# Patient Record
Sex: FEMALE | Race: WHITE | NOT HISPANIC OR LATINO | Employment: FULL TIME | URBAN - NONMETROPOLITAN AREA
[De-identification: names, ages, dates, MRNs, and addresses within clinical notes are randomized per-mention and may not be internally consistent; named-entity substitution may affect disease eponyms.]

---

## 2017-11-21 ENCOUNTER — TRANSCRIBE ORDERS (OUTPATIENT)
Dept: RADIOLOGY | Facility: MEDICAL CENTER | Age: 32
End: 2017-11-21

## 2017-11-21 ENCOUNTER — APPOINTMENT (OUTPATIENT)
Dept: LAB | Facility: MEDICAL CENTER | Age: 32
End: 2017-11-21
Payer: COMMERCIAL

## 2017-11-21 ENCOUNTER — APPOINTMENT (OUTPATIENT)
Dept: FAMILY MEDICINE CLINIC | Facility: CLINIC | Age: 32
End: 2017-11-21
Payer: COMMERCIAL

## 2017-11-21 ENCOUNTER — GENERIC CONVERSION - ENCOUNTER (OUTPATIENT)
Dept: OTHER | Facility: OTHER | Age: 32
End: 2017-11-21

## 2017-11-21 DIAGNOSIS — Z36.89 ENCOUNTER FOR OTHER SPECIFIED ANTENATAL SCREENING (CODE): ICD-10-CM

## 2017-11-21 DIAGNOSIS — Z34.90 ENCOUNTER FOR SUPERVISION OF NORMAL PREGNANCY: ICD-10-CM

## 2017-11-21 LAB — B-HCG SERPL-ACNC: ABNORMAL MIU/ML

## 2017-11-21 PROCEDURE — 36415 COLL VENOUS BLD VENIPUNCTURE: CPT

## 2017-11-21 PROCEDURE — T1015 CLINIC SERVICE: HCPCS | Performed by: FAMILY MEDICINE

## 2017-11-21 PROCEDURE — 81025 URINE PREGNANCY TEST: CPT | Performed by: FAMILY MEDICINE

## 2017-11-21 PROCEDURE — 84702 CHORIONIC GONADOTROPIN TEST: CPT

## 2017-11-22 ENCOUNTER — GENERIC CONVERSION - ENCOUNTER (OUTPATIENT)
Dept: OTHER | Facility: OTHER | Age: 32
End: 2017-11-22

## 2017-12-06 ENCOUNTER — TRANSCRIBE ORDERS (OUTPATIENT)
Dept: ADMINISTRATIVE | Facility: HOSPITAL | Age: 32
End: 2017-12-06

## 2017-12-06 DIAGNOSIS — Z34.90 PREGNANCY, UNSPECIFIED GESTATIONAL AGE: Primary | ICD-10-CM

## 2017-12-12 ENCOUNTER — HOSPITAL ENCOUNTER (OUTPATIENT)
Dept: ULTRASOUND IMAGING | Facility: HOSPITAL | Age: 32
Discharge: HOME | End: 2017-12-12
Attending: OBSTETRICS & GYNECOLOGY
Payer: COMMERCIAL

## 2017-12-12 ENCOUNTER — GENERIC CONVERSION - ENCOUNTER (OUTPATIENT)
Dept: OTHER | Facility: OTHER | Age: 32
End: 2017-12-12

## 2017-12-12 DIAGNOSIS — Z36.89 ENCOUNTER FOR OTHER SPECIFIED ANTENATAL SCREENING (CODE): ICD-10-CM

## 2017-12-12 PROCEDURE — 76801 OB US < 14 WKS SINGLE FETUS: CPT

## 2018-01-22 VITALS
SYSTOLIC BLOOD PRESSURE: 120 MMHG | RESPIRATION RATE: 16 BRPM | OXYGEN SATURATION: 97 % | HEART RATE: 91 BPM | TEMPERATURE: 97.7 F | WEIGHT: 167 LBS | HEIGHT: 61 IN | BODY MASS INDEX: 31.53 KG/M2 | DIASTOLIC BLOOD PRESSURE: 80 MMHG

## 2018-01-23 NOTE — RESULT NOTES
Verified Results  US OB LESS THAN 14 WKS WITH TRANSVAGINAL [id number] 12:52PM Maria Piper Order Number: [id number]    - Patient Instructions: To schedule this appointment, please contact Central Scheduling at [phone]  Test Name Result Flag Reference   US OB LESS THAN 14 WEEKS WITH TRANSVAGINAL (Report)     FIRST TRIMESTER OBSTETRIC ULTRASOUND, COMPLETE     INDICATION: Pregnant, evaluate dating  COMPARISON: None  TECHNIQUE:  Transabdominal ultrasound of the pelvis was performed  Additional transvaginal imaging was then performed to better assess the gestation, myometrial/endometrial architecture and ovarian parenchymal detail  The study includes volumetric    sweeps and traditional still imaging technique  FINDINGS:     A single live intrauterine gestation is identified  Cardiac activity is present  Heart rate of 151 bpm      YOLK SAC: Present and normal in size and appearance  MEAN GESTATIONAL SAC SIZE: 60 mm = 12 weeks 1 days    MEAN CROWN RUMP LENGTH: 6 mm = 12 weeks 5 days    FETAL ANATOMY: Appropriate for gestational age  AMNIOTIC FLUID/SAC SHAPE: Within expected normal range  PLACENTA: The placenta is appropriate for gestational age  There is no significant subchorionic fluid collection  UTERUS/ADNEXA:    A 6 5 x 6 9 x 6 5 cm cyst is identified within the left adnexa with a prominent mural nodule identified  Right ovary is unremarkable  The cervix remains closed  No free fluid present  IMPRESSION:     Single live intrauterine gestation at 12 weeks 3 days (range +/- 6 days)  FREDERICK of 6/23/2018  6 5 x 6 9 x 6 5 cm left adnexal cyst with prominent mural nodule  Close interval follow-up of this potentially neoplastic lesion is recommended  Workstation performed: [id number]     Signed by:    Claudio De Leon MD   12/12/17

## 2018-01-26 ENCOUNTER — LAB REQUISITION (OUTPATIENT)
Dept: LAB | Facility: HOSPITAL | Age: 33
End: 2018-01-26
Payer: COMMERCIAL

## 2018-01-26 ENCOUNTER — INITIAL PRENATAL (OUTPATIENT)
Dept: OBGYN CLINIC | Facility: MEDICAL CENTER | Age: 33
End: 2018-01-26
Payer: COMMERCIAL

## 2018-01-26 DIAGNOSIS — Z34.92 ENCOUNTER FOR PREGNANCY RELATED EXAMINATION, SECOND TRIMESTER: Primary | ICD-10-CM

## 2018-01-26 DIAGNOSIS — Z34.92 ENCOUNTER FOR SUPERVISION OF NORMAL PREGNANCY IN SECOND TRIMESTER: ICD-10-CM

## 2018-01-26 LAB
ABO GROUP BLD: NORMAL
BASOPHILS # BLD AUTO: 0.02 THOUSANDS/ΜL (ref 0–0.1)
BASOPHILS NFR BLD AUTO: 0 % (ref 0–1)
BLD GP AB SCN SERPL QL: NEGATIVE
EOSINOPHIL # BLD AUTO: 0.06 THOUSAND/ΜL (ref 0–0.61)
EOSINOPHIL NFR BLD AUTO: 1 % (ref 0–6)
ERYTHROCYTE [DISTWIDTH] IN BLOOD BY AUTOMATED COUNT: 14 % (ref 11.6–15.1)
HCT VFR BLD AUTO: 36 % (ref 34.8–46.1)
HGB BLD-MCNC: 12.3 G/DL (ref 11.5–15.4)
LYMPHOCYTES # BLD AUTO: 2.06 THOUSANDS/ΜL (ref 0.6–4.47)
LYMPHOCYTES NFR BLD AUTO: 19 % (ref 14–44)
MCH RBC QN AUTO: 29.6 PG (ref 26.8–34.3)
MCHC RBC AUTO-ENTMCNC: 34.2 G/DL (ref 31.4–37.4)
MCV RBC AUTO: 87 FL (ref 82–98)
MONOCYTES # BLD AUTO: 0.43 THOUSAND/ΜL (ref 0.17–1.22)
MONOCYTES NFR BLD AUTO: 4 % (ref 4–12)
NEUTROPHILS # BLD AUTO: 8.49 THOUSANDS/ΜL (ref 1.85–7.62)
NEUTS SEG NFR BLD AUTO: 76 % (ref 43–75)
NRBC BLD AUTO-RTO: 0 /100 WBCS
PLATELET # BLD AUTO: 278 THOUSANDS/UL (ref 149–390)
PMV BLD AUTO: 12.1 FL (ref 8.9–12.7)
RBC # BLD AUTO: 4.16 MILLION/UL (ref 3.81–5.12)
RH BLD: POSITIVE
SPECIMEN EXPIRATION DATE: NORMAL
WBC # BLD AUTO: 11.13 THOUSAND/UL (ref 4.31–10.16)

## 2018-01-26 PROCEDURE — 36415 COLL VENOUS BLD VENIPUNCTURE: CPT | Performed by: OBSTETRICS & GYNECOLOGY

## 2018-01-26 PROCEDURE — 82677 ASSAY OF ESTRIOL: CPT | Performed by: OBSTETRICS & GYNECOLOGY

## 2018-01-26 PROCEDURE — 82105 ALPHA-FETOPROTEIN SERUM: CPT | Performed by: OBSTETRICS & GYNECOLOGY

## 2018-01-26 PROCEDURE — 86336 INHIBIN A: CPT | Performed by: OBSTETRICS & GYNECOLOGY

## 2018-01-26 PROCEDURE — 80081 OBSTETRIC PANEL INC HIV TSTG: CPT | Performed by: OBSTETRICS & GYNECOLOGY

## 2018-01-26 PROCEDURE — 84702 CHORIONIC GONADOTROPIN TEST: CPT | Performed by: OBSTETRICS & GYNECOLOGY

## 2018-01-26 NOTE — PROGRESS NOTES
OB ED visit today   Consents signed  PN panel, Quad drawn today  1st ob visit set up for Monday with dr Shawna Ordoñez  Early GTT tbd at the visit

## 2018-01-27 LAB
HBV SURFACE AG SER QL: NORMAL
RUBV IGG SERPL IA-ACNC: 6.3 IU/ML

## 2018-01-29 ENCOUNTER — ROUTINE PRENATAL (OUTPATIENT)
Dept: OBGYN CLINIC | Facility: MEDICAL CENTER | Age: 33
End: 2018-01-29
Payer: COMMERCIAL

## 2018-01-29 VITALS — BODY MASS INDEX: 32.31 KG/M2 | WEIGHT: 171 LBS | SYSTOLIC BLOOD PRESSURE: 122 MMHG | DIASTOLIC BLOOD PRESSURE: 70 MMHG

## 2018-01-29 DIAGNOSIS — Z34.92 ENCOUNTER FOR PREGNANCY RELATED EXAMINATION IN SECOND TRIMESTER: Primary | ICD-10-CM

## 2018-01-29 DIAGNOSIS — Z11.51 SCREENING FOR HPV (HUMAN PAPILLOMAVIRUS): ICD-10-CM

## 2018-01-29 PROBLEM — O09.299 HISTORY OF GESTATIONAL DIABETES IN PRIOR PREGNANCY, CURRENTLY PREGNANT: Status: ACTIVE | Noted: 2018-01-29

## 2018-01-29 PROBLEM — Z86.32 HISTORY OF GESTATIONAL DIABETES IN PRIOR PREGNANCY, CURRENTLY PREGNANT: Status: ACTIVE | Noted: 2018-01-29

## 2018-01-29 PROBLEM — Q51.4 UNICORNUATE UTERUS: Status: ACTIVE | Noted: 2018-01-29

## 2018-01-29 PROBLEM — Z98.891 HISTORY OF CESAREAN SECTION, LOW TRANSVERSE: Status: ACTIVE | Noted: 2018-01-29

## 2018-01-29 PROBLEM — E66.9 OBESITY (BMI 30-39.9): Status: ACTIVE | Noted: 2018-01-29

## 2018-01-29 PROBLEM — N83.209 OVARIAN CYST: Status: ACTIVE | Noted: 2018-01-29

## 2018-01-29 PROBLEM — Z28.39 RUBELLA NON-IMMUNE STATUS, ANTEPARTUM: Status: ACTIVE | Noted: 2018-01-29

## 2018-01-29 PROBLEM — O09.899 RUBELLA NON-IMMUNE STATUS, ANTEPARTUM: Status: ACTIVE | Noted: 2018-01-29

## 2018-01-29 LAB
HIV 1+2 AB+HIV1 P24 AG SERPL QL IA: NORMAL
RPR SER QL: NORMAL

## 2018-01-29 PROCEDURE — 99213 OFFICE O/P EST LOW 20 MIN: CPT | Performed by: OBSTETRICS & GYNECOLOGY

## 2018-01-29 PROCEDURE — 87624 HPV HI-RISK TYP POOLED RSLT: CPT | Performed by: OBSTETRICS & GYNECOLOGY

## 2018-01-29 PROCEDURE — 87591 N.GONORRHOEAE DNA AMP PROB: CPT | Performed by: OBSTETRICS & GYNECOLOGY

## 2018-01-29 PROCEDURE — G0145 SCR C/V CYTO,THINLAYER,RESCR: HCPCS | Performed by: OBSTETRICS & GYNECOLOGY

## 2018-01-29 PROCEDURE — 87491 CHLMYD TRACH DNA AMP PROBE: CPT | Performed by: OBSTETRICS & GYNECOLOGY

## 2018-01-29 PROCEDURE — 87086 URINE CULTURE/COLONY COUNT: CPT | Performed by: OBSTETRICS & GYNECOLOGY

## 2018-01-29 PROCEDURE — 82950 GLUCOSE TEST: CPT | Performed by: OBSTETRICS & GYNECOLOGY

## 2018-01-29 RX ORDER — PNV NO.121/IRON/FOLIC ACID 28MG-0.8MG
1 TABLET ORAL DAILY
COMMUNITY
End: 2018-01-29 | Stop reason: SDUPTHER

## 2018-01-29 NOTE — PROGRESS NOTES
Karen Torre is a 29y o  year old  at 19w4d for first prenatal visit  Nausea No Vomiting No   Exam done today - see OB flowsheet  Pap done Yes   Gonorrhea and Chlamydia sent  Labs reviewed  Rubella equivocal    Genetic testing had Quad screen   Level 2 scheduled 18  Smoking cessation  Prior C/S - requesting repeat  History of A1GDM, early glucola done today    Pt has been counseled re diet, exercise, weight gain, foods to avoid, vaccines in pregnancy, trisomy screening, travel precautions to include seat belt use and VTE risk reduction  She has been provided our pregnancy packet which includes how and when to contact providers, medication recommendations, dietary suggestions, breastfeeding information as well as websites for additional information, hospital and delivery concerns

## 2018-01-30 LAB — GLUCOSE 1H P 50 G GLC PO SERPL-MCNC: 112 MG/DL

## 2018-01-31 LAB
BACTERIA UR CULT: NORMAL
CHLAMYDIA DNA CVX QL NAA+PROBE: NORMAL
N GONORRHOEA DNA GENITAL QL NAA+PROBE: NORMAL

## 2018-02-02 LAB
2ND TRIMESTER 4 SCREEN SERPL-IMP: NORMAL
2ND TRIMESTER 4 SCREEN SERPL-IMP: NORMAL
AFP ADJ MOM SERPL: 0.79
AFP SERPL-MCNC: 36.5 NG/ML
AGE AT DELIVERY: 30.9 YEARS
FET TS 18 RISK FROM MAT AGE: NORMAL
FET TS 21 RISK FROM MAT AGE: 622
GA METHOD: NORMAL
GA: 18.9 WEEKS
HCG ADJ MOM SERPL: 0.16
HCG SERPL-ACNC: 4001 MIU/ML
HPV RRNA GENITAL QL NAA+PROBE: NORMAL
IDDM PATIENT QL: NO
INHIBIN A ADJ MOM SERPL: 0.68
INHIBIN A SERPL-MCNC: 119.78 PG/ML
KARYOTYP BLD/T: NORMAL
LAB AP GYN PRIMARY INTERPRETATION: NORMAL
Lab: NORMAL
MULTIPLE PREGNANCY: NO
NEURAL TUBE DEFECT RISK FETUS: NORMAL %
SERVICE CMNT-IMP: NORMAL
TS 18 RISK FETUS: NORMAL
TS 21 RISK FETUS: NORMAL
U ESTRIOL ADJ MOM SERPL: 0.82
U ESTRIOL SERPL-MCNC: 1.27 NG/ML

## 2018-02-15 ENCOUNTER — ROUTINE PRENATAL (OUTPATIENT)
Dept: PERINATAL CARE | Facility: CLINIC | Age: 33
End: 2018-02-15
Payer: COMMERCIAL

## 2018-02-15 VITALS
DIASTOLIC BLOOD PRESSURE: 80 MMHG | HEIGHT: 61 IN | SYSTOLIC BLOOD PRESSURE: 114 MMHG | WEIGHT: 173 LBS | BODY MASS INDEX: 32.66 KG/M2 | HEART RATE: 92 BPM

## 2018-02-15 DIAGNOSIS — Z98.891 HISTORY OF CESAREAN SECTION, LOW TRANSVERSE: ICD-10-CM

## 2018-02-15 DIAGNOSIS — Z3A.22 22 WEEKS GESTATION OF PREGNANCY: ICD-10-CM

## 2018-02-15 DIAGNOSIS — IMO0001 ANOMALY OF KIDNEY OF FETUS, SINGLE OR UNSPECIFIED FETUS: ICD-10-CM

## 2018-02-15 DIAGNOSIS — Q51.4 UNICORNUATE UTERUS: Primary | ICD-10-CM

## 2018-02-15 PROBLEM — IMO0002 FETAL RENAL ANOMALY: Status: ACTIVE | Noted: 2018-02-15

## 2018-02-15 PROCEDURE — 99242 OFF/OP CONSLTJ NEW/EST SF 20: CPT | Performed by: OBSTETRICS & GYNECOLOGY

## 2018-02-15 NOTE — PROGRESS NOTES
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

## 2018-02-24 ENCOUNTER — HOSPITAL ENCOUNTER (EMERGENCY)
Facility: HOSPITAL | Age: 33
Discharge: HOME | End: 2018-02-24
Attending: EMERGENCY MEDICINE | Admitting: EMERGENCY MEDICINE
Payer: COMMERCIAL

## 2018-02-24 VITALS
BODY MASS INDEX: 33.5 KG/M2 | DIASTOLIC BLOOD PRESSURE: 66 MMHG | HEART RATE: 99 BPM | SYSTOLIC BLOOD PRESSURE: 116 MMHG | WEIGHT: 177.3 LBS | OXYGEN SATURATION: 100 % | TEMPERATURE: 98.4 F | RESPIRATION RATE: 22 BRPM

## 2018-02-24 DIAGNOSIS — O26.859 SPOTTING AND CRAMPING AFFECTING PREGNANCY, ANTEPARTUM: Primary | ICD-10-CM

## 2018-02-24 DIAGNOSIS — O26.899 SPOTTING AND CRAMPING AFFECTING PREGNANCY, ANTEPARTUM: Primary | ICD-10-CM

## 2018-02-24 DIAGNOSIS — R10.9 SPOTTING AND CRAMPING AFFECTING PREGNANCY, ANTEPARTUM: Primary | ICD-10-CM

## 2018-02-24 LAB
BILIRUB UR QL STRIP: NEGATIVE
CLARITY UR: CLEAR
COLOR UR: YELLOW
GLUCOSE UR STRIP-MCNC: NEGATIVE MG/DL
HGB UR QL STRIP.AUTO: NEGATIVE
KETONES UR STRIP-MCNC: NEGATIVE MG/DL
LEUKOCYTE ESTERASE UR QL STRIP: NEGATIVE
NITRITE UR QL STRIP: NEGATIVE
PH UR STRIP.AUTO: 8 [PH] (ref 4.5–8)
PROT UR STRIP-MCNC: NEGATIVE MG/DL
SP GR UR STRIP.AUTO: 1.01 (ref 1–1.03)
UROBILINOGEN UR QL STRIP.AUTO: 0.2 E.U./DL

## 2018-02-24 PROCEDURE — 99284 EMERGENCY DEPT VISIT MOD MDM: CPT

## 2018-02-24 PROCEDURE — 81003 URINALYSIS AUTO W/O SCOPE: CPT | Performed by: EMERGENCY MEDICINE

## 2018-02-24 NOTE — DISCHARGE INSTRUCTIONS
Recommendations from Dr Elliott Loera, on for Dr Sarai Jose:  1) count cramps - if more than 4 per hour, call them right away - they may ask you to be checked at their facility  2) drink enough water that your urine is almost colorless - if it's yellow, drink more water  Abdominal Pain in Pregnancy   WHAT YOU NEED TO KNOW:   Abdominal pain during pregnancy is common  Some of the causes include heartburn, constipation, gas, false labor, and round ligament pain  Round ligament pain is caused by stretching of the ligaments that support your uterus  Abdominal pain may be caused by a health problem, such as a stomach virus or appendicitis (inflammation of the appendix)  The pain may also be caused by a problem with your pregnancy, such as a threatened miscarriage or  labor  DISCHARGE INSTRUCTIONS:   Follow up with your obstetrician within 3 days:  Write down your questions so you remember to ask them during your visits  Self-care:   · Rest may help to relieve round ligament pain  Ask your healthcare provider about other ways to relieve this pain, such as a supportive belt or pregnancy exercises  · Use a heating pad set to the lowest setting or a hot compress to apply heat to your abdomen  Do this for 20 to 30 minutes every 2 hours for as many days as directed  · Avoid quick changes in position or movements that cause pain  · Do not lie flat in bed or bend over if you have heartburn  Ask your obstetrician if you should make any changes to the foods you eat  Ask if you can take any medicines for heartburn  · Eat more fiber and drink more liquids to relieve constipation  Fiber is found in fruits, vegetables, and whole-grain foods, such as whole-wheat bread and cereals  Ask how much liquid to drink each day and which liquids are best for you  Contact your obstetrician if:  · You continue to have abdominal pain that cannot be relieved  · You have a fever       · You have questions or concerns about your condition or care  Return to the emergency department if:   · You have sudden, severe pain or cramps that are so bad that you cannot walk or talk  · You have a fast heartbeat, shortness of breath, and you feel lightheaded or faint  · You have vaginal bleeding or discharge  · You have nausea, vomiting, fever, and severe pain on your right side  © 2017 [address] [employer] Information is for End User's use only and may not be sold, redistributed or otherwise used for commercial purposes  All illustrations and images included in CareNotes® are the copyrighted property of A D A M , Inc  or Prince Val[employer]  The above information is an  only  It is not intended as medical advice for individual conditions or treatments  Talk to your doctor, nurse or pharmacist before following any medical regimen to see if it is safe and effective for you

## 2018-02-24 NOTE — ED PROVIDER NOTES
History  Chief Complaint   Patient presents with    Abdominal Pain Pregnant     pt states that she woke up with spotting and cramping in stomach  is about 24 weeks pregnant     Patient: Lawrence Calvo  32 y o /female  YOB: 1985  MRN: [id number]  PCP: Alex Harmon PA-C  Date of evaluation: 2018    (RICHARDSON Corbin may have been used in the preparation of this document )    This patient presents with her significant other with a chief complaint of spotting and cramping in her low abdomen since awakening this morning  She is about 23 weeks pregnant  Her due date is    She is a   She is being followed as a high risk patient due to a structural problem with 1 of the kidneys in her fetus  Cramping is similar to menstrual cramps  She states that the timing of the cramps is "random"  The cramps last for about 1 minutes when they com  The time between them will be between 5 and 30 minutes  She has had no nausea, vomiting, diarrhea, dysuria, frequency, urgency, sensation of incomplete voiding  History provided by:  Patient, significant other and medical records      Prior to Admission Medications   Prescriptions Last Dose Informant Patient Reported? Taking?    Prenatal Vit-Fe Fumarate-FA (PRENATAL VITAMIN PO)   Yes Yes   Sig: Take 1 tablet by mouth daily      Facility-Administered Medications: None       Past Medical History:   Diagnosis Date    Cancer [facility])     sister with lukemia maternal grandmother passed with skin cancer     Depression 2017    after miscarriage    Gestational diabetes 2014    pregnancy with daughter    Migraine     Suspected problem with fetal growth not found     last assessed 14       Past Surgical History:   Procedure Laterality Date     SECTION  10/10/2014    due to daughter being breech    FOOT SURGERY Left        Family History   Problem Relation Age of Onset    Heart defect Mother      cardiac disorder    Leukemia Sister     Cancer Family     Diabetes Family     Leukemia Family     Parkinsonism Family     No Known Problems Father     No Known Problems Brother     No Known Problems Daughter     No Known Problems Sister     No Known Problems Sister     No Known Problems Sister     No Known Problems Sister     No Known Problems Sister     No Known Problems Sister     No Known Problems Sister     No Known Problems Sister     Cleft palate Brother     No Known Problems Brother     No Known Problems Brother     No Known Problems Brother      I have reviewed and agree with the history as documented  Social History   Substance Use Topics    Smoking status: Current Every Day Smoker     Packs/day: 0 25     Types: Cigarettes    Smokeless tobacco: Never Used    Alcohol use No        Review of Systems   Constitutional: Negative for chills and fever  HENT: Negative for hearing loss, trouble swallowing and voice change  Eyes: Negative for pain, redness and visual disturbance  Respiratory: Negative for cough and shortness of breath  Cardiovascular: Negative for chest pain and palpitations  Gastrointestinal: Negative for abdominal pain, constipation, diarrhea, nausea and vomiting  Genitourinary: Negative for decreased urine volume, difficulty urinating, dysuria, genital sores, hematuria, pelvic pain, urgency and vaginal discharge  Vaginal bleeding: spotting  Musculoskeletal: Negative for back pain, gait problem and neck pain  Skin: Negative for color change and rash  Neurological: Negative for weakness and light-headedness  Psychiatric/Behavioral: Negative for confusion and decreased concentration  The patient is not nervous/anxious  All other systems reviewed and are negative        Physical Exam  ED Triage Vitals [02/24/18 1524]   Temperature Pulse Respirations Blood Pressure SpO2   98 4 °F (36 9 °C) 99 22 116/66 100 %      Temp Source Heart Rate Source Patient Position - Orthostatic VS BP Location FiO2 (%)   Temporal Monitor Sitting Left arm --      Pain Score       4           Orthostatic Vital Signs  Vitals:    02/24/18 1524   BP: 116/66   Pulse: 99   Patient Position - Orthostatic VS: Sitting       Physical Exam   Constitutional: She is oriented to person, place, and time  She appears well-developed and well-nourished  HENT:   Mouth/Throat: Oropharynx is clear and moist and mucous membranes are normal    Voice normal   Eyes: EOM are normal  Pupils are equal, round, and reactive to light  Cardiovascular: Normal rate and regular rhythm  Pulmonary/Chest: Effort normal    Abdominal: Soft  Bowel sounds are normal  There is no tenderness  Gravid   Neurological: She is alert and oriented to person, place, and time  GCS eye subscore is 4  GCS verbal subscore is 5  GCS motor subscore is 6  Skin: Skin is warm and dry  Psychiatric: She has a normal mood and affect  Her speech is normal and behavior is normal    Nursing note and vitals reviewed  ED Medications  Medications - No data to display    Diagnostic Studies  Results Reviewed     Procedure Component Value Units Date/Time    UA w Reflex to Microscopic [[id number]]  (Normal) Collected:  02/24/18 1537    Lab Status:  Final result Specimen:  Urine from Urine, Clean Catch Updated:  02/24/18 1542     Color, UA Yellow     Clarity, UA Clear     Specific Gravity, UA 1 015     pH, UA 8 0     Leukocytes, UA Negative     Nitrite, UA Negative     Protein, UA Negative mg/dl      Glucose, UA Negative mg/dl      Ketones, UA Negative mg/dl      Urobilinogen, UA 0 2 E U /dl      Bilirubin, UA Negative     Blood, UA Negative                 No orders to display              Procedures  Procedures       Phone Contacts  ED Phone Contact    ED Course  ED Course as of Feb 24 2339   Sat Feb 24, 2018   1545 T reassuring  [address] pt's OB office - she sees Dr Taveras Masters           Recommendations from Dr Madan Catalan, on for   Amanda:  1) count cramps - if more than 4 per hour, call them right away - they may ask you to be checked at their facility  2) drink enough water that your urine is almost colorless - if it's yellow, drink more water  [facility] [place]  CritCare Time    Disposition  Final diagnoses:   Spotting and cramping affecting pregnancy, antepartum     Time reflects when diagnosis was documented in both MDM as applicable and the Disposition within this note     Time User Action Codes Description Comment    2/24/2018  4:04 PM Elsa Roberts Add [O26 859,  O26 899] Spotting and cramping affecting pregnancy, antepartum       ED Disposition     ED Disposition Condition Comment    Discharge  Ana Luisa Bowman discharge to home/self care  Condition at discharge: Good        Follow-up Information     Follow up With Specialties Details Why [address] [facility], Gynecology Call in 2 days For followup, Tell about this ER visit  [facility] [address]          Discharge Medication List as of 2/24/2018  4:14 PM      CONTINUE these medications which have NOT CHANGED    Details   Prenatal Vit-Fe Fumarate-FA (PRENATAL VITAMIN PO) Take 1 tablet by mouth daily, Starting Tue 11/21/2017, Historical Med           No discharge procedures on file      ED Provider  Electronically Signed by           Favian Bach MD  02/24/18 9847

## 2018-02-27 ENCOUNTER — HOSPITAL ENCOUNTER (EMERGENCY)
Facility: HOSPITAL | Age: 33
Discharge: HOME | End: 2018-02-27
Attending: EMERGENCY MEDICINE | Admitting: EMERGENCY MEDICINE
Payer: OTHER MISCELLANEOUS

## 2018-02-27 VITALS
TEMPERATURE: 98.4 F | SYSTOLIC BLOOD PRESSURE: 122 MMHG | DIASTOLIC BLOOD PRESSURE: 87 MMHG | BODY MASS INDEX: 34.39 KG/M2 | WEIGHT: 182 LBS | OXYGEN SATURATION: 100 % | RESPIRATION RATE: 16 BRPM | HEART RATE: 88 BPM

## 2018-02-27 DIAGNOSIS — S01.01XA SCALP LACERATION, INITIAL ENCOUNTER: Primary | ICD-10-CM

## 2018-02-27 DIAGNOSIS — S00.83XA FOREHEAD CONTUSION, INITIAL ENCOUNTER: ICD-10-CM

## 2018-02-27 PROCEDURE — 90471 IMMUNIZATION ADMIN: CPT

## 2018-02-27 PROCEDURE — 99283 EMERGENCY DEPT VISIT LOW MDM: CPT

## 2018-02-27 PROCEDURE — 90715 TDAP VACCINE 7 YRS/> IM: CPT | Performed by: EMERGENCY MEDICINE

## 2018-02-27 RX ORDER — ACETAMINOPHEN 325 MG/1
650 TABLET ORAL ONCE
Status: COMPLETED | OUTPATIENT
Start: 2018-02-27 | End: 2018-02-27

## 2018-02-27 RX ADMIN — TETANUS TOXOID, REDUCED DIPHTHERIA TOXOID AND ACELLULAR PERTUSSIS VACCINE, ADSORBED 0.5 ML: 5; 2.5; 8; 8; 2.5 SUSPENSION INTRAMUSCULAR at 16:39

## 2018-02-27 RX ADMIN — ACETAMINOPHEN 650 MG: 325 TABLET, FILM COATED ORAL at 16:32

## 2018-02-27 NOTE — ED PROVIDER NOTES
History  Chief Complaint   Patient presents with    Head Injury     Patient was opening a metal cabinet when the door hit her on the top of the head  Patient denies any LOC and is not on any blood thinners  Patient is 23 weeks pregnant  82-year-old female on duty at [employer] [facility] [employer] [facility] she was adjusting a shelf when a 2nd shelf came down and hit her in the forehead since she sustained a scalp laceration of the right frontal region  There is no loss of conscious she did not sustain a fall she has a mild headache no visual disturbance she was she had some brief fleeting nausea at the scene which resolved the bleeding has been controlled with direct pressure  She denies any neck pain she has had no numbness tingling no difficulties with speech no malocclusion no nosebleed she is unsure of the last tetanus she has no weakness of baby continues to be active she is 23 weeks pregnant she follows with Dr Jose Chavez  out of [facility]  She denies any direct trauma to the abdomen she has no chest pain no shortness of breath no lightheadedness no weakness  Prior to Admission Medications   Prescriptions Last Dose Informant Patient Reported? Taking?    Prenatal Vit-Fe Fumarate-FA (PRENATAL VITAMIN PO)   Yes Yes   Sig: Take 1 tablet by mouth daily      Facility-Administered Medications: None       Past Medical History:   Diagnosis Date    Cancer [facility])     sister with lukemia maternal grandmother passed with skin cancer     Depression 2017    after miscarriage    Gestational diabetes     pregnancy with daughter    Migraine     Suspected problem with fetal growth not found     last assessed 14       Past Surgical History:   Procedure Laterality Date     SECTION  10/10/2014    due to daughter being breech    FOOT SURGERY Left        Family History   Problem Relation Age of Onset    Heart defect Mother      cardiac disorder    Leukemia Sister     Cancer Family     Diabetes Family     Leukemia Family     Parkinsonism Family     No Known Problems Father     No Known Problems Brother     No Known Problems Daughter     No Known Problems Sister     No Known Problems Sister     No Known Problems Sister     No Known Problems Sister     No Known Problems Sister     No Known Problems Sister     No Known Problems Sister     No Known Problems Sister     Cleft palate Brother     No Known Problems Brother     No Known Problems Brother     No Known Problems Brother      I have reviewed and agree with the history as documented  Social History   Substance Use Topics    Smoking status: Current Every Day Smoker     Packs/day: 0 20     Types: Cigarettes    Smokeless tobacco: Never Used    Alcohol use No        Review of Systems   Constitutional: Negative for activity change, chills and fever  HENT: Negative for congestion, ear pain, nosebleeds and rhinorrhea  Eyes: Negative for discharge and visual disturbance  Respiratory: Negative for cough, choking and shortness of breath  Cardiovascular: Negative for chest pain and leg swelling  Gastrointestinal: Negative for abdominal distention, abdominal pain, nausea and vomiting  Genitourinary: Negative for pelvic pain, vaginal bleeding, vaginal discharge and vaginal pain  Musculoskeletal: Negative for back pain, neck pain and neck stiffness  Skin: Positive for wound (rt frontal scalp)  Neurological: Positive for headaches  Negative for dizziness, syncope, weakness, light-headedness and numbness  Hematological: Does not bruise/bleed easily  Psychiatric/Behavioral: Negative for confusion  All other systems reviewed and are negative        Physical Exam  ED Triage Vitals   Temperature Pulse Respirations Blood Pressure SpO2   02/27/18 1610 02/27/18 1610 02/27/18 1610 02/27/18 1610 02/27/18 1610   98 4 °F (36 9 °C) 90 20 115/77 98 %      Temp Source Heart Rate Source Patient Position - Orthostatic VS BP Location FiO2 (%) 02/27/18 1610 02/27/18 1610 02/27/18 1610 02/27/18 1701 --   Temporal Monitor Sitting Left arm       Pain Score       02/27/18 1632       4           Orthostatic Vital Signs  Vitals:    02/27/18 1610 02/27/18 1615 02/27/18 1630 02/27/18 1701   BP: 115/77 120/80 121/81 122/87   Pulse: 90 82 84 88   Patient Position - Orthostatic VS: Sitting   Sitting       Physical Exam   Constitutional: She is oriented to person, place, and time  She appears well-developed and well-nourished  No distress  HENT:   Head:       Right Ear: External ear normal    Left Ear: External ear normal    Nose: Nose normal    Mouth/Throat: Oropharynx is clear and moist  No oropharyngeal exudate  1cm rt frontal scalp laceration ; sl erythema rt forehead   Eyes: Conjunctivae and EOM are normal  Pupils are equal, round, and reactive to light  Right eye exhibits no discharge  Left eye exhibits no discharge  3mm equal   Neck: Normal range of motion  Neck supple  No midline or paraspinous tenderness   Cardiovascular: Normal rate, regular rhythm, normal heart sounds and intact distal pulses  Pulmonary/Chest: Effort normal and breath sounds normal  No respiratory distress  She has no wheezes  She has no rales  Abdominal: Soft  Bowel sounds are normal  She exhibits no distension and no mass  There is no tenderness  There is no guarding  Back no midline T or L spine tenderness   Musculoskeletal: Normal range of motion  She exhibits no edema, tenderness or deformity  Lymphadenopathy:     She has no cervical adenopathy  Neurological: She is alert and oriented to person, place, and time  She displays normal reflexes  No cranial nerve deficit or sensory deficit  She exhibits normal muscle tone  Coordination normal    GCS 15 steady gait to bathroom   Skin: Skin is warm and dry  Capillary refill takes less than 2 seconds  Psychiatric: She has a normal mood and affect  Vitals reviewed        ED Medications  Medications   acetaminophen (TYLENOL) tablet 650 mg (650 mg Oral Given 2/27/18 1632)   tetanus-diphtheria-acellular pertussis (BOOSTRIX) IM injection 0 5 mL (0 5 mL Intramuscular Given 2/27/18 1639)       Diagnostic Studies  Results Reviewed     None                 No orders to display              Procedures  Lac Repair  Date/Time: 2/27/2018 4:46 PM  Performed by: Niles Lesch  Authorized by: Niles Lesch   Consent: Verbal consent obtained  Risks and benefits: risks, benefits and alternatives were discussed  Consent given by: patient  Patient understanding: patient states understanding of the procedure being performed  Patient identity confirmed: verbally with patient and arm band  Body area: head/neck  Location details: scalp  Laceration length: 1 cm    Wound Dehiscence:  Superficial Wound Dehiscence: simple closure      Procedure Details:  Irrigation solution: saline  Irrigation method: syringe  Amount of cleaning: standard  Skin closure: glue (hair tie)  Number of sutures: 2 (hair ties)  Approximation: close  Approximation difficulty: simple  Dressing: none    Patient tolerance: Patient tolerated the procedure well with no immediate complications             Phone Contacts  ED Phone Contact    ED Course  ED Course                                MDM  Number of Diagnoses or Management Options  Forehead contusion, initial encounter:   Scalp laceration, initial encounter:   Diagnosis management comments: Fetal heart tones 153; workman's comp paperwork filled out    CritCare Time    Disposition  Final diagnoses:   Scalp laceration, initial encounter - 1 cm simple hair tie closure   Forehead contusion, initial encounter     Time reflects when diagnosis was documented in both MDM as applicable and the Disposition within this note     Time User Action Codes Description Comment    2/27/2018  4:47 PM Aria Blood Add [S01 01XA] Scalp laceration, initial encounter     2/27/2018  4:47 PM Aria Blood Modify [S01 01XA] Scalp laceration, initial encounter 1 cm simple hair tie closure    2/27/2018  4:48 PM Juju Espana Forehead contusion, initial encounter       ED Disposition     ED Disposition Condition Comment    Discharge  Ana Luisa Bowman discharge to home/self care  Condition at discharge: Good        Follow-up Information     Follow up With Specialties Details Why Contact Info Additional Information    [address] [facility] [address] [facility] [address] [facility], [address] [facility] [place], [address]        Discharge Medication List as of 2/27/2018  4:59 PM      CONTINUE these medications which have NOT CHANGED    Details   Prenatal Vit-Fe Fumarate-FA (PRENATAL VITAMIN PO) Take 1 tablet by mouth daily, Starting Tue 11/21/2017, Historical Med           No discharge procedures on file      ED Provider  Electronically Signed by           Shameka Palacios MD  02/27/18 9423

## 2018-02-27 NOTE — DISCHARGE INSTRUCTIONS
Skin Adhesive Care   WHAT YOU NEED TO KNOW:   Skin adhesive is medical glue used to close wounds  It is a substitute for staples and stitches  Skin adhesive wound closures take less time and do not require anesthesia  You have less pain and a lower risk of infection than with staples or stitches  Skin adhesive will fall off after the wound is healed  DISCHARGE INSTRUCTIONS:   Self-care:   · Keep your wound clean and dry  for 1 to 5 days  You can shower 24 hours after the skin adhesive is applied  Lightly pat your wound dry after you shower  · Do not soak  your wound in water, such as in a bath or hot tub  · Do not scrub  your wound or pick at the adhesive  This can make your wound reopen  · Do not apply ointments  to your wound  These include antibiotic and other ointments that contain petroleum jelly  These products will remove skin adhesive and reopen your wound  Follow up with your healthcare provider as directed:  Write down your questions so you remember to ask them during your visits  Contact your healthcare provider if:   · You have a fever  · Your wound is red and warm to touch  · You have questions or concerns about your condition or care  Return to the emergency department if:   · Your wound has fluid draining from it  · Your wound opens  © 2017 [address] Information is for End User's use only and may not be sold, redistributed or otherwise used for commercial purposes  All illustrations and images included in CareNotes® are the copyrighted property of A D A M , Inc  or Prince Valdes  The above information is an  only  It is not intended as medical advice for individual conditions or treatments  Talk to your doctor, nurse or pharmacist before following any medical regimen to see if it is safe and effective for you  Head Injury   WHAT YOU NEED TO KNOW:   A head injury is most often caused by a blow to the head   This may occur from a fall, bicycle injury, sports injury, being struck in the head, or a motor vehicle accident  DISCHARGE INSTRUCTIONS:   Call 911 or have someone else call for any of the following:   · You cannot be woken  · You have a seizure  · You stop responding to others or you faint  · You have blurry or double vision  · Your speech becomes slurred or confused  · You have arm or leg weakness, loss of feeling, or new problems with coordination  · Your pupils are larger than usual or one pupil is a different size than the other  · You have blood or clear fluid coming out of your ears or nose  Return to the emergency department if:   · You have repeated or forceful vomiting  · You feel confused  · Your headache gets worse or becomes severe  · You or someone caring for you notices that you are harder to wake than usual   Contact your healthcare provider if:   · Your symptoms last longer than 6 weeks after the injury  · You have questions or concerns about your condition or care  Medicines:   · Acetaminophen  decreases pain  Acetaminophen is available without a doctor's order  Ask how much to take and how often to take it  Follow directions  Acetaminophen can cause liver damage if not taken correctly  · Take your medicine as directed  Contact your healthcare provider if you think your medicine is not helping or if you have side effects  Tell him or her if you are allergic to any medicine  Keep a list of the medicines, vitamins, and herbs you take  Include the amounts, and when and why you take them  Bring the list or the pill bottles to follow-up visits  Carry your medicine list with you in case of an emergency  Self-care:   · Rest  or do quiet activities for 24 to 48 hours  Limit your time watching TV, using the computer, or doing tasks that require a lot of thinking  Slowly return to your normal activities as directed   Do not play sports or do activities that may cause you to get hit in the head  Ask your healthcare provider when you can return to sports  · Apply ice  on your head for 15 to 20 minutes every hour or as directed  Use an ice pack, or put crushed ice in a plastic bag  Cover it with a towel before you apply it to your skin  Ice helps prevent tissue damage and decreases swelling and pain  · Have someone stay with you for 24 hours  or as directed  This person can monitor you for complications and call [id number]  When you are awake the person should ask you a few questions to see if you are thinking clearly  An example would be to ask your name or your address  Prevent another head injury:   · Wear a helmet that fits properly  Do this when you play sports, or ride a bike, scooter, or skateboard  Helmets help decrease your risk of a serious head injury  Talk to your healthcare provider about other ways you can protect yourself if you play sports  · Wear your seat belt every time you are in a car  This helps to decrease your risk for a head injury if you are in a car accident  Follow up with your healthcare provider as directed:  Write down your questions so you remember to ask them during your visits  © 2017 [address]  Information is for End User's use only and may not be sold, redistributed or otherwise used for commercial purposes  All illustrations and images included in CareNotes® are the copyrighted property of [employer]  or Prince Valdes  The above information is an  only  It is not intended as medical advice for individual conditions or treatments  Talk to your doctor, nurse or pharmacist before following any medical regimen to see if it is safe and effective for you    Tylenol 650 mg every 6 hours as needed for pain

## 2018-03-12 ENCOUNTER — ROUTINE PRENATAL (OUTPATIENT)
Dept: OBGYN CLINIC | Facility: MEDICAL CENTER | Age: 33
End: 2018-03-12
Payer: COMMERCIAL

## 2018-03-12 VITALS — BODY MASS INDEX: 35.01 KG/M2 | SYSTOLIC BLOOD PRESSURE: 132 MMHG | WEIGHT: 185.3 LBS | DIASTOLIC BLOOD PRESSURE: 70 MMHG

## 2018-03-12 DIAGNOSIS — O09.899 RUBELLA NON-IMMUNE STATUS, ANTEPARTUM: ICD-10-CM

## 2018-03-12 DIAGNOSIS — Z3A.25 25 WEEKS GESTATION OF PREGNANCY: Primary | ICD-10-CM

## 2018-03-12 DIAGNOSIS — IMO0001 ANOMALY OF KIDNEY OF FETUS, SINGLE OR UNSPECIFIED FETUS: ICD-10-CM

## 2018-03-12 DIAGNOSIS — Z28.39 RUBELLA NON-IMMUNE STATUS, ANTEPARTUM: ICD-10-CM

## 2018-03-12 PROBLEM — N83.209 OVARIAN CYST: Status: RESOLVED | Noted: 2018-01-29 | Resolved: 2018-03-12

## 2018-03-12 PROBLEM — Z3A.22 22 WEEKS GESTATION OF PREGNANCY: Status: RESOLVED | Noted: 2018-02-15 | Resolved: 2018-03-12

## 2018-03-12 PROCEDURE — 99213 OFFICE O/P EST LOW 20 MIN: CPT | Performed by: NURSE PRACTITIONER

## 2018-03-12 NOTE — PROGRESS NOTES
Liyah Snyder is a 29y o  year old  at 21w3d for routine prenatal visit    + FM, no vaginal bleeding, contractions, or LOF  Complaints: No   Most recent ultrasound and labs reviewed  States trying to schedule f/u scan at [facility] due mid march  Will try calling again or have  Spouse call to make appt  Following for multicystic dysplastic kidney, and other kidney w pyeletasis  [facility]kc, ptl precautions given

## 2018-03-26 ENCOUNTER — ULTRASOUND (OUTPATIENT)
Dept: PERINATAL CARE | Facility: CLINIC | Age: 33
End: 2018-03-26
Payer: COMMERCIAL

## 2018-03-26 VITALS
HEART RATE: 99 BPM | HEIGHT: 61 IN | WEIGHT: 183.2 LBS | BODY MASS INDEX: 34.59 KG/M2 | SYSTOLIC BLOOD PRESSURE: 116 MMHG | DIASTOLIC BLOOD PRESSURE: 79 MMHG

## 2018-03-26 DIAGNOSIS — IMO0001 ANOMALY OF KIDNEY OF FETUS, SINGLE OR UNSPECIFIED FETUS: Primary | ICD-10-CM

## 2018-03-26 DIAGNOSIS — Z3A.27 27 WEEKS GESTATION OF PREGNANCY: ICD-10-CM

## 2018-03-26 PROCEDURE — 99212 OFFICE O/P EST SF 10 MIN: CPT | Performed by: OBSTETRICS & GYNECOLOGY

## 2018-03-26 PROCEDURE — 76816 OB US FOLLOW-UP PER FETUS: CPT | Performed by: OBSTETRICS & GYNECOLOGY

## 2018-04-10 ENCOUNTER — ROUTINE PRENATAL (OUTPATIENT)
Dept: OBGYN CLINIC | Facility: MEDICAL CENTER | Age: 33
End: 2018-04-10
Payer: COMMERCIAL

## 2018-04-10 VITALS — DIASTOLIC BLOOD PRESSURE: 66 MMHG | BODY MASS INDEX: 34.96 KG/M2 | WEIGHT: 185 LBS | SYSTOLIC BLOOD PRESSURE: 110 MMHG

## 2018-04-10 DIAGNOSIS — Q61.4 MULTICYSTIC DYSPLASTIC KIDNEY: ICD-10-CM

## 2018-04-10 DIAGNOSIS — Z34.92 ENCOUNTER FOR PREGNANCY RELATED EXAMINATION IN SECOND TRIMESTER: Primary | ICD-10-CM

## 2018-04-10 LAB
BASOPHILS # BLD AUTO: 0.03 THOUSANDS/ΜL (ref 0–0.1)
BASOPHILS NFR BLD AUTO: 0 % (ref 0–1)
EOSINOPHIL # BLD AUTO: 0.11 THOUSAND/ΜL (ref 0–0.61)
EOSINOPHIL NFR BLD AUTO: 1 % (ref 0–6)
ERYTHROCYTE [DISTWIDTH] IN BLOOD BY AUTOMATED COUNT: 13.9 % (ref 11.6–15.1)
GLUCOSE 1H P 50 G GLC PO SERPL-MCNC: 176 MG/DL
HCT VFR BLD AUTO: 34.6 % (ref 34.8–46.1)
HGB BLD-MCNC: 11.4 G/DL (ref 11.5–15.4)
LYMPHOCYTES # BLD AUTO: 2.67 THOUSANDS/ΜL (ref 0.6–4.47)
LYMPHOCYTES NFR BLD AUTO: 18 % (ref 14–44)
MCH RBC QN AUTO: 29.2 PG (ref 26.8–34.3)
MCHC RBC AUTO-ENTMCNC: 32.9 G/DL (ref 31.4–37.4)
MCV RBC AUTO: 89 FL (ref 82–98)
MONOCYTES # BLD AUTO: 0.58 THOUSAND/ΜL (ref 0.17–1.22)
MONOCYTES NFR BLD AUTO: 4 % (ref 4–12)
NEUTROPHILS # BLD AUTO: 11.12 THOUSANDS/ΜL (ref 1.85–7.62)
NEUTS SEG NFR BLD AUTO: 77 % (ref 43–75)
NRBC BLD AUTO-RTO: 0 /100 WBCS
PLATELET # BLD AUTO: 242 THOUSANDS/UL (ref 149–390)
PMV BLD AUTO: 11.5 FL (ref 8.9–12.7)
RBC # BLD AUTO: 3.9 MILLION/UL (ref 3.81–5.12)
WBC # BLD AUTO: 14.66 THOUSAND/UL (ref 4.31–10.16)

## 2018-04-10 PROCEDURE — 99213 OFFICE O/P EST LOW 20 MIN: CPT | Performed by: NURSE PRACTITIONER

## 2018-04-10 PROCEDURE — 36415 COLL VENOUS BLD VENIPUNCTURE: CPT | Performed by: NURSE PRACTITIONER

## 2018-04-10 PROCEDURE — 82950 GLUCOSE TEST: CPT | Performed by: NURSE PRACTITIONER

## 2018-04-10 PROCEDURE — 85025 COMPLETE CBC W/AUTO DIFF WBC: CPT | Performed by: NURSE PRACTITIONER

## 2018-04-10 NOTE — PROGRESS NOTES
Mg Agudelo is a 29y o  year old  at 34w10d for routine prenatal visit    + FM, no vaginal bleeding, contractions, or LOF  Complaints: recently confirmed that baby does have one multicystic dysplastic kidney at [facility]  Would like more info about prognosis, Gave referral to Dr Liz Belcher nephologist    [facility]  Most recent ultrasound and labs reviewed  Glucose and cbc drawn  Rhogam not needed  28 week booklet given  Pt  To keep appt  next week with  , states was r/s from Dr lewis due to snow in the past    States is interested in [facility], PTL reviewed

## 2018-04-12 ENCOUNTER — TELEPHONE (OUTPATIENT)
Dept: OBGYN CLINIC | Facility: MEDICAL CENTER | Age: 33
End: 2018-04-12

## 2018-04-12 DIAGNOSIS — R73.09 ELEVATED GLUCOSE TOLERANCE TEST: Primary | ICD-10-CM

## 2018-04-23 ENCOUNTER — ULTRASOUND (OUTPATIENT)
Dept: PERINATAL CARE | Facility: CLINIC | Age: 33
End: 2018-04-23
Payer: COMMERCIAL

## 2018-04-23 VITALS
DIASTOLIC BLOOD PRESSURE: 76 MMHG | WEIGHT: 182.6 LBS | HEART RATE: 106 BPM | SYSTOLIC BLOOD PRESSURE: 116 MMHG | BODY MASS INDEX: 34.48 KG/M2 | HEIGHT: 61 IN

## 2018-04-23 DIAGNOSIS — IMO0001 ANOMALY OF KIDNEY OF FETUS, SINGLE OR UNSPECIFIED FETUS: Primary | ICD-10-CM

## 2018-04-23 DIAGNOSIS — Z3A.31 31 WEEKS GESTATION OF PREGNANCY: ICD-10-CM

## 2018-04-23 PROCEDURE — 76816 OB US FOLLOW-UP PER FETUS: CPT | Performed by: OBSTETRICS & GYNECOLOGY

## 2018-04-23 PROCEDURE — 99212 OFFICE O/P EST SF 10 MIN: CPT | Performed by: OBSTETRICS & GYNECOLOGY

## 2018-04-23 NOTE — PATIENT INSTRUCTIONS

## 2018-05-04 ENCOUNTER — ROUTINE PRENATAL (OUTPATIENT)
Dept: OBGYN CLINIC | Facility: MEDICAL CENTER | Age: 33
End: 2018-05-04
Payer: COMMERCIAL

## 2018-05-04 VITALS — DIASTOLIC BLOOD PRESSURE: 70 MMHG | BODY MASS INDEX: 34.56 KG/M2 | SYSTOLIC BLOOD PRESSURE: 114 MMHG | WEIGHT: 182.9 LBS

## 2018-05-04 DIAGNOSIS — Z3A.33 33 WEEKS GESTATION OF PREGNANCY: Primary | ICD-10-CM

## 2018-05-04 PROCEDURE — 99213 OFFICE O/P EST LOW 20 MIN: CPT | Performed by: NURSE PRACTITIONER

## 2018-05-04 NOTE — PROGRESS NOTES
Allyn Hicks is a 29y o  year old  at 33w1d for routine prenatal visit    + FM, no vaginal bleeding, contractions, or LOF  Complaints: No   Most recent ultrasound and labs reviewed  Pt has birth plan to complete, and to call for pbv  Pt  Wants btl with c/s  Mirian to schedule c/s with btl for  per Dr Aj Morgan  [facility], ptl precautions reviewed

## 2018-05-21 ENCOUNTER — ROUTINE PRENATAL (OUTPATIENT)
Dept: OBGYN CLINIC | Facility: MEDICAL CENTER | Age: 33
End: 2018-05-21
Payer: COMMERCIAL

## 2018-05-21 VITALS — WEIGHT: 183.9 LBS | DIASTOLIC BLOOD PRESSURE: 80 MMHG | BODY MASS INDEX: 34.75 KG/M2 | SYSTOLIC BLOOD PRESSURE: 112 MMHG

## 2018-05-21 DIAGNOSIS — Z34.93 THIRD TRIMESTER PREGNANCY: Primary | ICD-10-CM

## 2018-05-21 PROCEDURE — 87653 STREP B DNA AMP PROBE: CPT | Performed by: OBSTETRICS & GYNECOLOGY

## 2018-05-21 PROCEDURE — 99213 OFFICE O/P EST LOW 20 MIN: CPT | Performed by: OBSTETRICS & GYNECOLOGY

## 2018-05-21 NOTE — PROGRESS NOTES
Nayana Mcdonnell is a 29y o  year old  at 35w4d for routine prenatal visit  GBS done Yes  PCN allergy Yes  Labor precautions given  [address]     Has follow up apt at [facility] for repeat scan and follow up left kidney  Patient has not been compliant with going for 3 hr gtt - stressed importance of this and risks of untreated GDM if present / note to excuse patient from work given for patient to go tomorrow to get this done  Has repeat C/S scheduled with Dr Saray Mann on 6/15

## 2018-05-22 ENCOUNTER — APPOINTMENT (OUTPATIENT)
Dept: LAB | Facility: HOSPITAL | Age: 33
End: 2018-05-22
Payer: COMMERCIAL

## 2018-05-22 LAB
GLUCOSE 1H P 100 G GLC PO SERPL-MCNC: 173 MG/DL (ref 65–179)
GLUCOSE 2H P 100 G GLC PO SERPL-MCNC: 168 MG/DL (ref 65–154)
GLUCOSE 3H P 100 G GLC PO SERPL-MCNC: 92 MG/DL (ref 40–500)
GLUCOSE P FAST SERPL-MCNC: 76 MG/DL (ref 65–99)

## 2018-05-22 PROCEDURE — 82951 GLUCOSE TOLERANCE TEST (GTT): CPT

## 2018-05-22 PROCEDURE — 82952 GTT-ADDED SAMPLES: CPT

## 2018-05-22 PROCEDURE — 36415 COLL VENOUS BLD VENIPUNCTURE: CPT

## 2018-05-23 LAB — GP B STREP DNA SPEC QL NAA+PROBE: NORMAL

## 2018-05-31 ENCOUNTER — ROUTINE PRENATAL (OUTPATIENT)
Dept: OBGYN CLINIC | Facility: MEDICAL CENTER | Age: 33
End: 2018-05-31
Payer: COMMERCIAL

## 2018-05-31 VITALS — DIASTOLIC BLOOD PRESSURE: 60 MMHG | BODY MASS INDEX: 33.82 KG/M2 | WEIGHT: 179 LBS | SYSTOLIC BLOOD PRESSURE: 112 MMHG

## 2018-05-31 DIAGNOSIS — O99.213 OBESITY AFFECTING PREGNANCY IN THIRD TRIMESTER: ICD-10-CM

## 2018-05-31 DIAGNOSIS — Z3A.37 37 WEEKS GESTATION OF PREGNANCY: ICD-10-CM

## 2018-05-31 DIAGNOSIS — Z98.891 HISTORY OF CESAREAN SECTION, LOW TRANSVERSE: Primary | ICD-10-CM

## 2018-05-31 PROCEDURE — 99213 OFFICE O/P EST LOW 20 MIN: CPT | Performed by: OBSTETRICS & GYNECOLOGY

## 2018-05-31 NOTE — PROGRESS NOTES
Paola Baumann is a 29y o  year old  at 37w0d for routine prenatal visit    + FM, no vaginal bleeding, contractions, or LOF  Complaints: No   Most recent ultrasound and labs reviewed  Scheduled for RLTCS/BTL 18 with Dr Mara Her

## 2018-06-04 ENCOUNTER — TELEPHONE (OUTPATIENT)
Dept: OBGYN CLINIC | Facility: MEDICAL CENTER | Age: 33
End: 2018-06-04

## 2018-06-04 ENCOUNTER — ULTRASOUND (OUTPATIENT)
Dept: PERINATAL CARE | Facility: CLINIC | Age: 33
End: 2018-06-04
Payer: COMMERCIAL

## 2018-06-04 ENCOUNTER — OFFICE VISIT (OUTPATIENT)
Dept: NEPHROLOGY | Facility: CLINIC | Age: 33
End: 2018-06-04
Payer: COMMERCIAL

## 2018-06-04 VITALS
WEIGHT: 179 LBS | DIASTOLIC BLOOD PRESSURE: 73 MMHG | HEIGHT: 61 IN | HEART RATE: 85 BPM | BODY MASS INDEX: 33.79 KG/M2 | SYSTOLIC BLOOD PRESSURE: 111 MMHG

## 2018-06-04 VITALS
HEIGHT: 61 IN | SYSTOLIC BLOOD PRESSURE: 118 MMHG | BODY MASS INDEX: 34.36 KG/M2 | DIASTOLIC BLOOD PRESSURE: 76 MMHG | WEIGHT: 182 LBS

## 2018-06-04 DIAGNOSIS — O35.8XX0 PREGNANCY COMPLICATED BY FETAL MULTICYSTIC DYSPLASTIC KIDNEY, SINGLE OR UNSPECIFIED FETUS: ICD-10-CM

## 2018-06-04 DIAGNOSIS — Z3A.37 37 WEEKS GESTATION OF PREGNANCY: ICD-10-CM

## 2018-06-04 DIAGNOSIS — IMO0001 ANOMALY OF KIDNEY OF FETUS, SINGLE OR UNSPECIFIED FETUS: Primary | ICD-10-CM

## 2018-06-04 DIAGNOSIS — O99.213 OBESITY AFFECTING PREGNANCY IN THIRD TRIMESTER: ICD-10-CM

## 2018-06-04 DIAGNOSIS — O35.8XX3: Primary | ICD-10-CM

## 2018-06-04 LAB
SL AMB  POCT GLUCOSE, UA: NEGATIVE
SL AMB LEUKOCYTE ESTERASE,UA: NEGATIVE
SL AMB POCT BILIRUBIN,UA: NEGATIVE
SL AMB POCT BLOOD,UA: NEGATIVE
SL AMB POCT CLARITY,UA: CLEAR
SL AMB POCT COLOR,UA: YELLOW
SL AMB POCT KETONES,UA: NEGATIVE
SL AMB POCT NITRITE,UA: NEGATIVE
SL AMB POCT PH,UA: 6.5
SL AMB POCT SPECIFIC GRAVITY,UA: 1.01
SL AMB POCT URINE PROTEIN: NEGATIVE
SL AMB POCT UROBILINOGEN: 0.2

## 2018-06-04 PROCEDURE — 76816 OB US FOLLOW-UP PER FETUS: CPT | Performed by: OBSTETRICS & GYNECOLOGY

## 2018-06-04 PROCEDURE — 99212 OFFICE O/P EST SF 10 MIN: CPT | Performed by: OBSTETRICS & GYNECOLOGY

## 2018-06-04 PROCEDURE — 99243 OFF/OP CNSLTJ NEW/EST LOW 30: CPT | Performed by: PEDIATRICS

## 2018-06-04 PROCEDURE — 81002 URINALYSIS NONAUTO W/O SCOPE: CPT | Performed by: PEDIATRICS

## 2018-06-04 NOTE — PATIENT INSTRUCTIONS
1  Fetal anomaly: Discussed potential implications multicystic dysplastic kidney with mom today  Will plan to check an ultrasound after infant is born to determine next steps  Plan for infant to see us in nephrology after birth

## 2018-06-04 NOTE — LETTER
June 4, 2018     Rachel Holley PA-C  [facility]    Patient: Noemí Shi   YOB: 1985   Date of Visit: 6/4/2018       Dear Dr Ina Mandujano: Thank you for referring Alfredo Slater to me for evaluation  Below are my notes for this consultation  If you have questions, please do not hesitate to call me  I look forward to following your patient along with you  Sincerely,        Mariam Kowalski MD        CC: MD Mariam Smith MD  6/4/2018 12:54 PM  Sign at close encounter  Pediatric Nephrology Consultation  Name:Ana Luisa Bowman  IQZ:[id number]  Date:06/04/18      Assessment/Plan   Assessment:  29year old female with history of fetal renal anomaly  Plan:  Diagnoses and all orders for this visit:    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, antepartum, fetus 3 of multiple gestation  -     POCT urine dip    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, single or unspecified fetus      Patient Instructions   1  Fetal anomaly: Discussed potential implications multicystic dysplastic kidney with mom today  Will plan to check an ultrasound after infant is born to determine next steps  Plan for infant to see us in nephrology after birth  Reviewed potential reasons for urinary tract dilatation in infants with mom today  Should renal ultrasound demonstrate multicystic dysplastic kidney with no contralateral urinary tract dilatation, would plan to discharge home with renal scan to be performed as an outpatient and follow up in nephrology clinic  Should renal ultrasound demonstrate multicystic dysplastic kidney with contralateral urinary tract dilatation, would plan to send home on antibiotic prophylaxis with plans to perform both renal scan as well as VCUG  Reviewed both the VCUG and renal scan studies with mom today and what to expect  HPI: Noemí Shi is a 29 y  o female who presents for evaluation of   Chief Complaint   Patient presents with    Consult     Сергей Isrrael was found to have an abnormal prenatal ultrasound at 22 week study  Сергей Arcos states that the fetus was found to have a multicystic left kidney with right pyelectasis (6mm) on initial imaging  She has continued to have follow up with OB and MFM for imaging  Right urinary tract dilatation has continued to remain present on subsequent imaging and repeat ultrasound is scheduled for today per mom  The remainder of pregnancy has been going well per Сергей Arcos with no additional issues  Scheduled for  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  No issues with diabetes for this pregnancy or elevated blood pressures  With regards to family history, there is a maternal uncle with a solitary kidney but no other kidney issues that Сергей Arcos could think of at this time         Past Medical History:   Diagnosis Date    Cancer [facility])     sister with lukemia maternal grandmother passed with skin cancer     Depression 2017    after miscarriage    Gestational diabetes 2014    pregnancy with daughter    Migraine     Suspected problem with fetal growth not found     last assessed 14         Past Surgical History:   Procedure Laterality Date     SECTION  10/10/2014    due to daughter being breech    FOOT SURGERY Left       Family History   Problem Relation Age of Onset    Heart defect Mother      cardiac disorder    Leukemia Sister     Cancer Family     Diabetes Family     Leukemia Family     Parkinsonism Family     No Known Problems Father     No Known Problems Brother     No Known Problems Daughter     No Known Problems Sister     No Known Problems Sister     No Known Problems Sister     No Known Problems Sister     No Known Problems Sister     No Known Problems Sister     No Known Problems Sister     No Known Problems Sister     Cleft palate Brother     No Known Problems Brother     No Known Problems Brother     No Known Problems Brother      Social History     Social History    Marital status: Single     Spouse name: N/A    Number of children: N/A    Years of education: N/A     Occupational History    Not on file  Social History Main Topics    Smoking status: Current Every Day Smoker     Packs/day: 0 20     Types: Cigarettes    Smokeless tobacco: Never Used      Comment: 4/5 cigarettes a day    Alcohol use No    Drug use: No      Comment: using marijuana as per [employer]    Sexual activity: Yes     Partners: Male     Other Topics Concern    Not on file     Social History Narrative    No narrative on file       Allergies   Allergen Reactions    Amoxicillin Anaphylaxis    Penicillins Anaphylaxis    Codeine Nausea Only     itching        Current Outpatient Prescriptions:     Prenatal Vit-Fe Fumarate-FA (PRENATAL VITAMIN PO), Take 1 tablet by mouth daily, Disp: , Rfl:      Objective   Vitals:    06/04/18 0931   BP: 118/76     Growth percentile SmartLinks can only be used for patients less than 21years old  5' 1" (1 549 m)  82 6 kg (182 lb)  Body mass index is 34 39 kg/m²          Imaging: as noted above    All laboratory results and imaging was reviewed by me and summarized above         Total time spent with patient today was 50 minutes with greater than 50% of that time spent in counseling

## 2018-06-04 NOTE — PROGRESS NOTES
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, antepartum, fetus 3 of multiple gestation  -     POCT urine dip    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, single or unspecified fetus      Patient Instructions   1  Fetal anomaly: Discussed potential implications multicystic dysplastic kidney with mom today  Will plan to check an ultrasound after infant is born to determine next steps  Plan for infant to see us in nephrology after birth  Reviewed potential reasons for urinary tract dilatation in infants with mom today and possible relationship to multicystic dysplastic kidney  Should renal ultrasound demonstrate multicystic dysplastic kidney with no contralateral urinary tract dilatation, would plan to discharge home with a renal scan to be performed as an outpatient and follow up in nephrology clinic  Should renal ultrasound demonstrate multicystic dysplastic kidney with contralateral urinary tract dilatation, would plan to send home on antibiotic prophylaxis with plans to perform both renal scan as well as VCUG  Reviewed both the VCUG and renal scan studies with mom today and what to expect  I also discussed potential outcome of multicystic dysplastic kidney and potential management of solitary kidney today as well  HPI: Al Hahn is a 29 y  o female who presents for evaluation of   Chief Complaint   Patient presents with    Consult     Tavo Bullock was found to have an abnormal prenatal ultrasound at 22 week study  Tavo Bullock states that the fetus was found to have a multicystic left kidney with right pyelectasis (6mm) on initial imaging  She has continued to have follow up with OB and MFM for imaging    Right urinary tract dilatation has continued to remain present on subsequent imaging and repeat ultrasound is scheduled for today per mom  The remainder of pregnancy has been going well per Melinda Friends with no additional issues  Scheduled for  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  No issues with diabetes for this pregnancy or elevated blood pressures  With regards to family history, there is a maternal uncle with a solitary kidney but no other kidney issues that Melinda Friends could think of at this time  Past Medical History:   Diagnosis Date    Cancer [facility])     sister with lukemia maternal grandmother passed with skin cancer     Depression     after miscarriage    Gestational diabetes     pregnancy with daughter    Migraine     Suspected problem with fetal growth not found     last assessed 14         Past Surgical History:   Procedure Laterality Date     SECTION  10/10/2014    due to daughter being breech    FOOT SURGERY Left       Family History   Problem Relation Age of Onset    Heart defect Mother      cardiac disorder    Leukemia Sister     Cancer Family     Diabetes Family     Leukemia Family     Parkinsonism Family     No Known Problems Father     No Known Problems Brother     No Known Problems Daughter     No Known Problems Sister     No Known Problems Sister     No Known Problems Sister     No Known Problems Sister     No Known Problems Sister     No Known Problems Sister     No Known Problems Sister     No Known Problems Sister     Cleft palate Brother     No Known Problems Brother     No Known Problems Brother     No Known Problems Brother      Social History     Social History    Marital status: Single     Spouse name: N/A    Number of children: N/A    Years of education: N/A     Occupational History    Not on file       Social History Main Topics    Smoking status: Current Every Day Smoker     Packs/day: 0 20     Types: Cigarettes    Smokeless tobacco: Never Used Comment: 4/5 cigarettes a day    Alcohol use No    Drug use: No      Comment: using marijuana as per [employer]    Sexual activity: Yes     Partners: Male     Other Topics Concern    Not on file     Social History Narrative    No narrative on file       Allergies   Allergen Reactions    Amoxicillin Anaphylaxis    Penicillins Anaphylaxis    Codeine Nausea Only     itching        Current Outpatient Prescriptions:     Prenatal Vit-Fe Fumarate-FA (PRENATAL VITAMIN PO), Take 1 tablet by mouth daily, Disp: , Rfl:      Objective   Vitals:    06/04/18 0931   BP: 118/76     Growth percentile SmartLinks can only be used for patients less than 21years old  5' 1" (1 549 m)  82 6 kg (182 lb)  Body mass index is 34 39 kg/m²          Imaging: as noted above    All laboratory results and imaging was reviewed by me and summarized above         Total time spent with patient today was 50 minutes with greater than 50% of that time spent in counseling

## 2018-06-04 NOTE — TELEPHONE ENCOUNTER
----- Message from [facility] sent at 6/4/2018 11:36 AM EDT -----  Effective 12/1/17  [id number] does not need a PA  $0 deductible or oop    6/4/18 at 1136

## 2018-06-12 ENCOUNTER — ROUTINE PRENATAL (OUTPATIENT)
Dept: OBGYN CLINIC | Facility: CLINIC | Age: 33
End: 2018-06-12
Payer: COMMERCIAL

## 2018-06-12 VITALS — WEIGHT: 182 LBS | BODY MASS INDEX: 34.39 KG/M2 | DIASTOLIC BLOOD PRESSURE: 80 MMHG | SYSTOLIC BLOOD PRESSURE: 110 MMHG

## 2018-06-12 DIAGNOSIS — Z98.891 HISTORY OF CESAREAN SECTION, LOW TRANSVERSE: ICD-10-CM

## 2018-06-12 DIAGNOSIS — Z3A.38 38 WEEKS GESTATION OF PREGNANCY: ICD-10-CM

## 2018-06-12 DIAGNOSIS — O99.213 OBESITY AFFECTING PREGNANCY IN THIRD TRIMESTER: Primary | ICD-10-CM

## 2018-06-12 PROCEDURE — 99213 OFFICE O/P EST LOW 20 MIN: CPT | Performed by: OBSTETRICS & GYNECOLOGY

## 2018-06-12 NOTE — PROGRESS NOTES
Kingsley Fragoso is a 29y o  year old  at 38w5d for routine prenatal visit    + FM, no vaginal bleeding, contractions, or LOF  Complaints: No   Most recent ultrasound and labs reviewed  Scheduled for RLTCS + BTL 18

## 2018-06-13 ENCOUNTER — ROUTINE PRENATAL (OUTPATIENT)
Dept: OBGYN CLINIC | Facility: MEDICAL CENTER | Age: 33
End: 2018-06-13

## 2018-06-13 ENCOUNTER — ANESTHESIA EVENT (INPATIENT)
Dept: LABOR AND DELIVERY | Facility: HOSPITAL | Age: 33
DRG: 540 | End: 2018-06-13
Payer: COMMERCIAL

## 2018-06-14 ENCOUNTER — HOSPITAL ENCOUNTER (INPATIENT)
Facility: HOSPITAL | Age: 33
LOS: 2 days | Discharge: HOME | DRG: 540 | End: 2018-06-16
Attending: OBSTETRICS & GYNECOLOGY | Admitting: OBSTETRICS & GYNECOLOGY
Payer: COMMERCIAL

## 2018-06-14 ENCOUNTER — ANESTHESIA (INPATIENT)
Dept: LABOR AND DELIVERY | Facility: HOSPITAL | Age: 33
DRG: 540 | End: 2018-06-14
Payer: COMMERCIAL

## 2018-06-14 DIAGNOSIS — Z98.891 HISTORY OF CESAREAN SECTION, LOW TRANSVERSE: Primary | ICD-10-CM

## 2018-06-14 PROBLEM — Z3A.39 39 WEEKS GESTATION OF PREGNANCY: Status: ACTIVE | Noted: 2018-06-14

## 2018-06-14 LAB
ABO GROUP BLD: NORMAL
BASE EXCESS BLDCOA CALC-SCNC: -2.1 MMOL/L (ref 3–11)
BASE EXCESS BLDCOV CALC-SCNC: -3.4 MMOL/L (ref 1–9)
BASOPHILS # BLD AUTO: 0.02 THOUSANDS/ΜL (ref 0–0.1)
BASOPHILS NFR BLD AUTO: 0 % (ref 0–1)
BLD GP AB SCN SERPL QL: NEGATIVE
EOSINOPHIL # BLD AUTO: 0.11 THOUSAND/ΜL (ref 0–0.61)
EOSINOPHIL NFR BLD AUTO: 1 % (ref 0–6)
ERYTHROCYTE [DISTWIDTH] IN BLOOD BY AUTOMATED COUNT: 14 % (ref 11.6–15.1)
HCO3 BLDCOA-SCNC: 25.8 MMOL/L (ref 17.3–27.3)
HCO3 BLDCOV-SCNC: 22.3 MMOL/L (ref 12.2–28.6)
HCT VFR BLD AUTO: 34.5 % (ref 34.8–46.1)
HGB BLD-MCNC: 11.8 G/DL (ref 11.5–15.4)
LYMPHOCYTES # BLD AUTO: 2.8 THOUSANDS/ΜL (ref 0.6–4.47)
LYMPHOCYTES NFR BLD AUTO: 22 % (ref 14–44)
MCH RBC QN AUTO: 29.8 PG (ref 26.8–34.3)
MCHC RBC AUTO-ENTMCNC: 34.2 G/DL (ref 31.4–37.4)
MCV RBC AUTO: 87 FL (ref 82–98)
MONOCYTES # BLD AUTO: 0.61 THOUSAND/ΜL (ref 0.17–1.22)
MONOCYTES NFR BLD AUTO: 5 % (ref 4–12)
NEUTROPHILS # BLD AUTO: 9.36 THOUSANDS/ΜL (ref 1.85–7.62)
NEUTS SEG NFR BLD AUTO: 73 % (ref 43–75)
NRBC BLD AUTO-RTO: 0 /100 WBCS
O2 CT VFR BLDCOA CALC: 11 ML/DL
OXYHGB MFR BLDCOA: 41.2 %
OXYHGB MFR BLDCOV: 67.5 %
PCO2 BLDCOA: 54.6 MM[HG] (ref 30–60)
PCO2 BLDCOV: 42.3 MM HG (ref 27–43)
PH BLDCOA: 7.29 [PH] (ref 7.23–7.43)
PH BLDCOV: 7.34 [PH] (ref 7.19–7.49)
PLATELET # BLD AUTO: 209 THOUSANDS/UL (ref 149–390)
PMV BLD AUTO: 11.4 FL (ref 8.9–12.7)
PO2 BLDCOA: 17.7 MM HG (ref 5–25)
PO2 BLDCOV: 29.6 MM HG (ref 15–45)
RBC # BLD AUTO: 3.96 MILLION/UL (ref 3.81–5.12)
RH BLD: POSITIVE
SAO2 % BLDCOV: 18.5 ML/DL
SPECIMEN EXPIRATION DATE: NORMAL
WBC # BLD AUTO: 12.9 THOUSAND/UL (ref 4.31–10.16)

## 2018-06-14 PROCEDURE — 86850 RBC ANTIBODY SCREEN: CPT | Performed by: FAMILY MEDICINE

## 2018-06-14 PROCEDURE — 0UB70ZZ EXCISION OF BILATERAL FALLOPIAN TUBES, OPEN APPROACH: ICD-10-PCS | Performed by: OBSTETRICS & GYNECOLOGY

## 2018-06-14 PROCEDURE — 58611 LIGATE OVIDUCT(S) ADD-ON: CPT | Performed by: OBSTETRICS & GYNECOLOGY

## 2018-06-14 PROCEDURE — 59514 CESAREAN DELIVERY ONLY: CPT | Performed by: OBSTETRICS & GYNECOLOGY

## 2018-06-14 PROCEDURE — 82805 BLOOD GASES W/O2 SATURATION: CPT | Performed by: OBSTETRICS & GYNECOLOGY

## 2018-06-14 PROCEDURE — 88302 TISSUE EXAM BY PATHOLOGIST: CPT | Performed by: PATHOLOGY

## 2018-06-14 PROCEDURE — 4A1HXCZ MONITORING OF PRODUCTS OF CONCEPTION, CARDIAC RATE, EXTERNAL APPROACH: ICD-10-PCS | Performed by: OBSTETRICS & GYNECOLOGY

## 2018-06-14 PROCEDURE — 10907ZC DRAINAGE OF AMNIOTIC FLUID, THERAPEUTIC FROM PRODUCTS OF CONCEPTION, VIA NATURAL OR ARTIFICIAL OPENING: ICD-10-PCS | Performed by: OBSTETRICS & GYNECOLOGY

## 2018-06-14 PROCEDURE — 86592 SYPHILIS TEST NON-TREP QUAL: CPT | Performed by: FAMILY MEDICINE

## 2018-06-14 PROCEDURE — 85025 COMPLETE CBC W/AUTO DIFF WBC: CPT | Performed by: FAMILY MEDICINE

## 2018-06-14 PROCEDURE — 86900 BLOOD TYPING SEROLOGIC ABO: CPT | Performed by: FAMILY MEDICINE

## 2018-06-14 PROCEDURE — 86901 BLOOD TYPING SEROLOGIC RH(D): CPT | Performed by: FAMILY MEDICINE

## 2018-06-14 RX ORDER — MORPHINE SULFATE 0.5 MG/ML
INJECTION, SOLUTION EPIDURAL; INTRATHECAL; INTRAVENOUS
Status: COMPLETED
Start: 2018-06-14 | End: 2018-06-14

## 2018-06-14 RX ORDER — DOCUSATE SODIUM 100 MG/1
100 CAPSULE, LIQUID FILLED ORAL 2 TIMES DAILY
Status: DISCONTINUED | OUTPATIENT
Start: 2018-06-14 | End: 2018-06-16 | Stop reason: HOSPADM

## 2018-06-14 RX ORDER — HYDROCODONE BITARTRATE AND ACETAMINOPHEN 5; 325 MG/1; MG/1
2 TABLET ORAL EVERY 4 HOURS PRN
Status: DISCONTINUED | OUTPATIENT
Start: 2018-06-15 | End: 2018-06-16 | Stop reason: HOSPADM

## 2018-06-14 RX ORDER — OXYTOCIN/RINGER'S LACTATE 30/500 ML
PLASTIC BAG, INJECTION (ML) INTRAVENOUS CONTINUOUS PRN
Status: DISCONTINUED | OUTPATIENT
Start: 2018-06-14 | End: 2018-06-14 | Stop reason: SURG

## 2018-06-14 RX ORDER — ACETAMINOPHEN 325 MG/1
650 TABLET ORAL EVERY 4 HOURS PRN
Status: DISCONTINUED | OUTPATIENT
Start: 2018-06-14 | End: 2018-06-16 | Stop reason: HOSPADM

## 2018-06-14 RX ORDER — HYDROCODONE BITARTRATE AND ACETAMINOPHEN 5; 325 MG/1; MG/1
1 TABLET ORAL EVERY 4 HOURS PRN
Status: DISCONTINUED | OUTPATIENT
Start: 2018-06-15 | End: 2018-06-16 | Stop reason: HOSPADM

## 2018-06-14 RX ORDER — SIMETHICONE 80 MG
80 TABLET,CHEWABLE ORAL 4 TIMES DAILY PRN
Status: DISCONTINUED | OUTPATIENT
Start: 2018-06-14 | End: 2018-06-16 | Stop reason: HOSPADM

## 2018-06-14 RX ORDER — ONDANSETRON 2 MG/ML
4 INJECTION INTRAMUSCULAR; INTRAVENOUS EVERY 4 HOURS PRN
Status: ACTIVE | OUTPATIENT
Start: 2018-06-14 | End: 2018-06-15

## 2018-06-14 RX ORDER — KETOROLAC TROMETHAMINE 30 MG/ML
INJECTION, SOLUTION INTRAMUSCULAR; INTRAVENOUS AS NEEDED
Status: DISCONTINUED | OUTPATIENT
Start: 2018-06-14 | End: 2018-06-14 | Stop reason: SURG

## 2018-06-14 RX ORDER — METOCLOPRAMIDE HYDROCHLORIDE 5 MG/ML
5 INJECTION INTRAMUSCULAR; INTRAVENOUS EVERY 6 HOURS PRN
Status: ACTIVE | OUTPATIENT
Start: 2018-06-14 | End: 2018-06-15

## 2018-06-14 RX ORDER — NALOXONE HYDROCHLORIDE 0.4 MG/ML
0.1 INJECTION, SOLUTION INTRAMUSCULAR; INTRAVENOUS; SUBCUTANEOUS
Status: ACTIVE | OUTPATIENT
Start: 2018-06-14 | End: 2018-06-15

## 2018-06-14 RX ORDER — SODIUM CHLORIDE, SODIUM LACTATE, POTASSIUM CHLORIDE, CALCIUM CHLORIDE 600; 310; 30; 20 MG/100ML; MG/100ML; MG/100ML; MG/100ML
125 INJECTION, SOLUTION INTRAVENOUS CONTINUOUS
Status: DISCONTINUED | OUTPATIENT
Start: 2018-06-14 | End: 2018-06-14

## 2018-06-14 RX ORDER — CLINDAMYCIN PHOSPHATE 900 MG/50ML
900 INJECTION INTRAVENOUS ONCE
Status: COMPLETED | OUTPATIENT
Start: 2018-06-14 | End: 2018-06-14

## 2018-06-14 RX ORDER — BUPIVACAINE HYDROCHLORIDE 7.5 MG/ML
INJECTION, SOLUTION INTRASPINAL AS NEEDED
Status: DISCONTINUED | OUTPATIENT
Start: 2018-06-14 | End: 2018-06-14 | Stop reason: SURG

## 2018-06-14 RX ORDER — MORPHINE SULFATE 0.5 MG/ML
INJECTION, SOLUTION EPIDURAL; INTRATHECAL; INTRAVENOUS AS NEEDED
Status: DISCONTINUED | OUTPATIENT
Start: 2018-06-14 | End: 2018-06-14 | Stop reason: SURG

## 2018-06-14 RX ORDER — IBUPROFEN 600 MG/1
600 TABLET ORAL EVERY 6 HOURS PRN
Status: DISCONTINUED | OUTPATIENT
Start: 2018-06-15 | End: 2018-06-16 | Stop reason: HOSPADM

## 2018-06-14 RX ORDER — FENTANYL CITRATE/PF 50 MCG/ML
25 SYRINGE (ML) INJECTION
Status: DISCONTINUED | OUTPATIENT
Start: 2018-06-14 | End: 2018-06-14

## 2018-06-14 RX ORDER — ONDANSETRON 2 MG/ML
4 INJECTION INTRAMUSCULAR; INTRAVENOUS EVERY 8 HOURS PRN
Status: DISCONTINUED | OUTPATIENT
Start: 2018-06-14 | End: 2018-06-16 | Stop reason: HOSPADM

## 2018-06-14 RX ORDER — FENTANYL CITRATE 50 UG/ML
INJECTION, SOLUTION INTRAMUSCULAR; INTRAVENOUS AS NEEDED
Status: DISCONTINUED | OUTPATIENT
Start: 2018-06-14 | End: 2018-06-14

## 2018-06-14 RX ORDER — KETOROLAC TROMETHAMINE 30 MG/ML
30 INJECTION, SOLUTION INTRAMUSCULAR; INTRAVENOUS EVERY 6 HOURS
Status: DISPENSED | OUTPATIENT
Start: 2018-06-14 | End: 2018-06-15

## 2018-06-14 RX ORDER — DIPHENHYDRAMINE HYDROCHLORIDE 50 MG/ML
25 INJECTION INTRAMUSCULAR; INTRAVENOUS EVERY 6 HOURS PRN
Status: ACTIVE | OUTPATIENT
Start: 2018-06-14 | End: 2018-06-15

## 2018-06-14 RX ORDER — DIPHENHYDRAMINE HCL 25 MG
25 TABLET ORAL EVERY 6 HOURS PRN
Status: DISCONTINUED | OUTPATIENT
Start: 2018-06-14 | End: 2018-06-16 | Stop reason: HOSPADM

## 2018-06-14 RX ORDER — OXYCODONE HYDROCHLORIDE AND ACETAMINOPHEN 5; 325 MG/1; MG/1
2 TABLET ORAL EVERY 6 HOURS PRN
Status: DISPENSED | OUTPATIENT
Start: 2018-06-14 | End: 2018-06-15

## 2018-06-14 RX ORDER — DEXAMETHASONE SODIUM PHOSPHATE 10 MG/ML
8 INJECTION, SOLUTION INTRAMUSCULAR; INTRAVENOUS ONCE AS NEEDED
Status: ACTIVE | OUTPATIENT
Start: 2018-06-14 | End: 2018-06-15

## 2018-06-14 RX ORDER — FENTANYL CITRATE 50 UG/ML
INJECTION, SOLUTION INTRAMUSCULAR; INTRAVENOUS AS NEEDED
Status: DISCONTINUED | OUTPATIENT
Start: 2018-06-14 | End: 2018-06-14 | Stop reason: SURG

## 2018-06-14 RX ORDER — BUPIVACAINE HYDROCHLORIDE 7.5 MG/ML
INJECTION, SOLUTION INTRASPINAL
Status: COMPLETED
Start: 2018-06-14 | End: 2018-06-14

## 2018-06-14 RX ORDER — ONDANSETRON 2 MG/ML
4 INJECTION INTRAMUSCULAR; INTRAVENOUS EVERY 8 HOURS PRN
Status: DISCONTINUED | OUTPATIENT
Start: 2018-06-14 | End: 2018-06-14

## 2018-06-14 RX ORDER — MAGNESIUM HYDROXIDE 1200 MG/15ML
LIQUID ORAL AS NEEDED
Status: DISCONTINUED | OUTPATIENT
Start: 2018-06-14 | End: 2018-06-14 | Stop reason: HOSPADM

## 2018-06-14 RX ORDER — FENTANYL CITRATE 50 UG/ML
INJECTION, SOLUTION INTRAMUSCULAR; INTRAVENOUS
Status: COMPLETED
Start: 2018-06-14 | End: 2018-06-14

## 2018-06-14 RX ORDER — ONDANSETRON 2 MG/ML
4 INJECTION INTRAMUSCULAR; INTRAVENOUS ONCE AS NEEDED
Status: DISCONTINUED | OUTPATIENT
Start: 2018-06-14 | End: 2018-06-14

## 2018-06-14 RX ADMIN — FENTANYL CITRATE 50 MCG: 50 INJECTION, SOLUTION INTRAMUSCULAR; INTRAVENOUS at 15:18

## 2018-06-14 RX ADMIN — KETOROLAC TROMETHAMINE 30 MG: 30 INJECTION, SOLUTION INTRAMUSCULAR at 15:31

## 2018-06-14 RX ADMIN — BUPIVACAINE HYDROCHLORIDE IN DEXTROSE 1.7 ML: 7.5 INJECTION, SOLUTION SUBARACHNOID at 14:22

## 2018-06-14 RX ADMIN — Medication 250 MILLI-UNITS/MIN: at 14:45

## 2018-06-14 RX ADMIN — FENTANYL CITRATE 50 MCG: 50 INJECTION, SOLUTION INTRAMUSCULAR; INTRAVENOUS at 15:24

## 2018-06-14 RX ADMIN — ONDANSETRON HYDROCHLORIDE 4 MG: 2 INJECTION, SOLUTION INTRAVENOUS at 14:04

## 2018-06-14 RX ADMIN — Medication 250 MILLI-UNITS/MIN: at 15:41

## 2018-06-14 RX ADMIN — SODIUM CHLORIDE, SODIUM LACTATE, POTASSIUM CHLORIDE, AND CALCIUM CHLORIDE 999 ML/HR: .6; .31; .03; .02 INJECTION, SOLUTION INTRAVENOUS at 11:17

## 2018-06-14 RX ADMIN — GENTAMICIN SULFATE 240 MG: 40 INJECTION, SOLUTION INTRAMUSCULAR; INTRAVENOUS at 14:21

## 2018-06-14 RX ADMIN — CLINDAMYCIN PHOSPHATE 900 MG: 18 INJECTION, SOLUTION INTRAMUSCULAR; INTRAVENOUS at 14:04

## 2018-06-14 RX ADMIN — KETOROLAC TROMETHAMINE 30 MG: 30 INJECTION, SOLUTION INTRAMUSCULAR at 22:55

## 2018-06-14 RX ADMIN — SODIUM CHLORIDE, SODIUM LACTATE, POTASSIUM CHLORIDE, AND CALCIUM CHLORIDE 999 ML/HR: .6; .31; .03; .02 INJECTION, SOLUTION INTRAVENOUS at 10:30

## 2018-06-14 RX ADMIN — DOCUSATE SODIUM 100 MG: 100 CAPSULE, LIQUID FILLED ORAL at 18:33

## 2018-06-14 RX ADMIN — SODIUM CHLORIDE, SODIUM LACTATE, POTASSIUM CHLORIDE, AND CALCIUM CHLORIDE: .6; .31; .03; .02 INJECTION, SOLUTION INTRAVENOUS at 15:38

## 2018-06-14 RX ADMIN — SODIUM CHLORIDE, SODIUM LACTATE, POTASSIUM CHLORIDE, AND CALCIUM CHLORIDE 125 ML/HR: .6; .31; .03; .02 INJECTION, SOLUTION INTRAVENOUS at 14:03

## 2018-06-14 RX ADMIN — SODIUM CHLORIDE, SODIUM LACTATE, POTASSIUM CHLORIDE, AND CALCIUM CHLORIDE: .6; .31; .03; .02 INJECTION, SOLUTION INTRAVENOUS at 14:52

## 2018-06-14 RX ADMIN — MORPHINE SULFATE 0.2 MG: 0.5 INJECTION, SOLUTION EPIDURAL; INTRATHECAL; INTRAVENOUS at 14:22

## 2018-06-14 NOTE — OP NOTE
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  Patient desires permanent sterilization    Procedure(s) (LRB):   SECTION () REPEAT (N/A)  LIGATION/COAGULATION TUBAL (N/A)    Specimen(s):  ID Type Source Tests Collected by Time Destination   1 : PRN Tissue Fallopian Tube, Left TISSUE EXAM Sherryle Hans, MD 2018    2 : PRN Tissue Fallopian Tube, Right TISSUE EXAM Sherryle Hans, MD 2018    A :  Tissue (Placenta on Hold) OB Only Placenta PLACENTA IN STORAGE Sherryle Hans, MD 2018    B :  Cord Blood Cord BLOOD GAS, VENOUS, CORD Sherryle Hans, MD 2018    C :  Cord Blood Cord BLOOD GAS, ARTERIAL, CORD Sherryle Hans, MD 2018        Estimated Blood Loss:   Minimal    Drains:  Urethral Catheter Latex 16 Fr  (Active)   Site Assessment Clean;Skin intact 2018  4:00 PM   Collection Container Standard drainage bag 2018  4:00 PM   Securement Method Securing device (Describe) 2018  4:00 PM   Number of days: 0       Anesthesia Type:   Spinal anesthesia    Operative Indications:  History of prior  section  Desires permanent sterilization  IUP at 39 weeks    Operative Findings:  1  External genitalia normal in appearance  No lacerations, no lesions, no ulcerations  2   Viable male  delivered at 14:43 on 2018 via low-transverse  section  Apgars 8 and 9 at 1 and 5 min respectively      3   Normal, intact placenta delivered at 14:46   4  Arterial cord gas: 7 292, base excess -2 1  5  Venous cord gas: 7 340, base excess -3 4    Complications:   None    Procedure and Technique:  The patient was seen prior to the procedure  Risks, benefits, possible complications, alternate treatment options, and expected outcomes were discussed with the patient during her prenatal course  The patient agreed with the proposed plan and gave informed consent  The patient was taken to [facility] Operating Room  She had received appropriate spinal anesthesia  Fetal heart tones had been assessed and SCDs were in place  The vagina was prepped with betadine and catsro catheter was placed in sterile fashion  The abdomen was prepped with Chloraprep and following appropriate drying time, the patient was draped in the usual sterile manner  A Time Out was held and the above information confirmed  The patient was identified as Jose Demarco and the procedure verified as  Delivery  A Pfannenstiel incision was made and carried down through the underlying subcutaneous tissue to the fascia using a scalpel  The rectus fascia was then nicked in the midline and dissected laterally using Snyder scissors  The superior edge of the  fascial incision was grasped with Kocher clamps bilaterally, tented upward and the underlying rectus muscles were dissected off sharply with scalpel  This was repeated on the inferior edge of the fascia and dissected down to the pubic rami  The rectus muscles were  and the peritoneum was identified, entered, and extended longitudinally with blunt dissection  Adhesions were observed upon entrance into the peritoneum  Adhesions involved omentum, abdominal rectus, and peritoneum  The bladder blade was inserted  A low transverse uterine incision was made with the scalpel and extended laterally with blunt dissection  The amnion was entered bluntly    The fetal head was palpated, elevated, and delivered through the uterine incision followed by the body without difficulty  The umbilical cord was clamped and cut  The infant was handed off to the  providers  Arterial and venous cord gases, cord blood, and a segment of umbilical cord were obtained for evaluation  The placenta delivered spontaneously with uterine fundal massage and appeared normal  The uterus was exteriorized and cleaned out with a moist lap sponge  The uterine incision was closed with a running locked suture of 0 Vicryl  Attention was then turned to the adnexa for the bilateral tubal ligation  The right adnexa was visualized  There is noted to be very reduced fallopian tube on the right side  Approximately 1-2 cm fimbriated tissue was visualized adhered to the right ovary  A Nini clamp was placed between fimbriated tissue in the ovarian tissue  Plain gut suture was used to ligate the fimbriated tissue away from the ovarian tissue  Metzenbaum scissors were used to cut the tissue away and removed from the abdomen  It was then passed off for pathology  The remaining plain gut suture was then used to placed a 2nd free tie around the [place] clamp for further hemostasis  The Nini clamp was removed and tissue was noted to be hemostatic  Attention was then turned to the left adnexa  The left fallopian tube was grossly normal in appearance  A [place] clamp was used to grasp the left fallopian tube and elevated away from the uterine arteries  Plain gut suture was placed through the medial salpinx to ligate the left fallopian tube via modified Kate method  A second plain gut suture was placed for further ligation and hemostasis  Metzenbaum scissors were used to remove the tube completely from the abdomen  The tube was then passed off for pathology  A second layer of the 0 vicryl suture was used to imbricate the first   Hemostasis was noted to be excellent  Normal saline solution was used to irrigate the posterior culdesac   The uterus was returned to the abdomen  The gutters were inspected and cleared of all clots and debris with irrigation and moist lap sponges  The rectus muscles were reapproximated with a single suture of plain gut  The fascia was closed with a running suture of 0 Vicryl  The subcuticular fat was reapproximated using 3-0 plain gut  Skin was closed with 4-0 monocryl  Histoacryl was applied  The patient appeared to tolerate the procedure very well  Lap sponge, needle, and instrument counts were correct x2  The patient was transferred to her postpartum recovery room in stable condition and her infant went to the  nursery  Dr Blessing Ramirez was present for the entire procedure       Patient Disposition:  PACU  and hemodynamically stable    SIGNATURE: Jesu Montiel MD  DATE: 2018  TIME: 4:21 PM

## 2018-06-14 NOTE — ANESTHESIA PROCEDURE NOTES
Spinal Block    Patient location during procedure: OB  Start time: 6/14/2018 2:15 PM  Reason for block: at surgeon's request and primary anesthetic  Staffing  Anesthesiologist: Makayla Mena  Performed: anesthesiologist   Preanesthetic Checklist  Completed: patient identified, site marked, surgical consent, pre-op evaluation, timeout performed, IV checked, risks and benefits discussed and monitors and equipment checked  Spinal Block  Prep: Betadine  Patient monitoring: heart rate, continuous pulse ox and frequent blood pressure checks  Approach: midline  Location: L3-4  Injection technique: single-shot  Needle  Needle type: pencil-tip   Needle gauge: 25 G  Needle length: 10 cm  Assessment  Sensory level: T4  Injection Assessment:  negative aspiration for heme, no paresthesia on injection and positive aspiration for clear CSF    Post-procedure:  site cleaned

## 2018-06-14 NOTE — H&P
History & Physical - OB/GYN   Timbo Oleary 29 y o  female MRN: [id number]  Unit/Bed#: L&D 329-02 Encounter: [id number]    Timbo Oleary is a patient of  [facility]    Chief complaint:  "Here for scheduled C section"    HPI:  Timbo Oleary is a 29 y o   female with an FREDERICK of 2018, by Ultrasound at 39w0d weeks gestation who is being admitted for Elective  Section, Repeat with tubal sterilization       Contractions:  None  Fetal movement:  Present  Vaginal bleeding:   Negative  Leaking of fluid:  Negative     Pregnancy Complications:  H/O Previous LTCS  Fetus with left multicystic dysplastic kidney and right pyelectasis  Uterine anomaly - Unicornuate uterus   H/O gestational diabetes - had abnormal 1 GTT f/b 3 hour GTT with normal fasting, 1- and 3- hour glucose       PMH:  Past Medical History:   Diagnosis Date    Cancer [facility])     sister with lukemia maternal grandmother passed with skin cancer     Depression     after miscarriage    Gestational diabetes     pregnancy with daughter    Migraine     Suspected problem with fetal growth not found     last assessed 14       PSH:  Past Surgical History:   Procedure Laterality Date     SECTION  10/10/2014    due to daughter being breech    FOOT SURGERY Left        Social Hx:  Denies EtOH, cigarettes 5 /day, and recreational drug use in pregnancy    OB Hx:  Obstetric History       T1      L1     SAB0   TAB0   Ectopic0   Multiple0   Live Births0       # Outcome Date GA Lbr Pancho/2nd Weight Sex Delivery Anes PTL Lv   3 Current            2 Term            1 AB               Obstetric Comments   Pt here today for OB Ed, Consents signed, PN panel was drawn and the Quad Screen   Pt could not leave a urine speciman   1st ob visit has been set up with Dr Chance Fernandez on 2018 2:15pm also to get early GTT due to gestational diabetes       Meds:  No current facility-administered medications on file prior to encounter  Current Outpatient Prescriptions on File Prior to Encounter   Medication Sig Dispense Refill    Prenatal Vit-Fe Fumarate-FA (PRENATAL VITAMIN PO) Take 1 tablet by mouth daily         Allergies: Allergies   Allergen Reactions    Amoxicillin Anaphylaxis    Penicillins Anaphylaxis    Codeine Nausea Only     itching       Review of Systems   Constitutional: Negative for chills and fever  HENT: Negative  Eyes: Negative  Negative for visual disturbance  Respiratory: Negative for cough, shortness of breath and wheezing  Cardiovascular: Negative for chest pain and palpitations  Gastrointestinal: Negative for diarrhea, nausea and vomiting  Genitourinary: Negative for dysuria and hematuria  Musculoskeletal: Negative  Skin: Negative for rash  Neurological: Negative for seizures and headaches  Psychiatric/Behavioral: Negative  Physical Exam   Constitutional: She is oriented to person, place, and time  She appears well-developed and well-nourished  No distress  HENT:   Head: Normocephalic and atraumatic  Mouth/Throat: No oropharyngeal exudate  Eyes: Pupils are equal, round, and reactive to light  Right eye exhibits no discharge  Left eye exhibits no discharge  Neck: Normal range of motion  Cardiovascular: Normal rate and regular rhythm  Exam reveals no gallop and no friction rub  No murmur heard  Pulmonary/Chest: Effort normal and breath sounds normal  No respiratory distress  She has no wheezes  She has no rales  Abdominal: There is no tenderness  Musculoskeletal: Normal range of motion  She exhibits no edema or tenderness  Lymphadenopathy:     She has no cervical adenopathy  Neurological: She is alert and oriented to person, place, and time  Skin: Skin is warm  No rash noted  She is not diaphoretic  Psychiatric: She has a normal mood and affect         Cervix:  Deferred     Fetal heart rate:   Baseline 135 bpm / Moderate variability/Accelerations: 15 x 15 or greater/ No decelerations/Cat I    [place]:   Contraction Frequency (minutes): none    EFW: 6 lb 10 oz (36w 6d)     GBS: Negative    Membranes: intact    Labs:  Hemoglobin: 11 8  Blood type: O+  Antibody: negative  Group B strep: negative  HIV: negative  Hepatitis B: negative  RPR: non-reactive   Rubella: Equivocal  Varicella Unknown  1 hour Glucose: normal (2018)    Assessment:   29 y o   at 39w0d with H/O LTCS, fetal left multicystic dysplastic kidney and right pyelectasis, H/O gestational diabetes with normal GTT 1 hour and rubella non immune status presents for scheduled C section  Patient allergic to PCN , type anaphylaxis  Plan:   1) Admit to L&D for scheduled RLTCS with BTL   - CBC, RPR, type and screen   - IVF  cc/hr for hydration, NPO    - External uterine and fetal monitoring   - Pre op prophylaxis with clindamycin  mg and gentamicin  IV (0 5mg/kg)   - Rubella immune status equivocal  Needs MMR post partum         D/w Dr Macario Adjutant and Dr Mario Evangelista MD  PGY-1 [facility] Medicine Resident  [address]   2018 11:12 AM

## 2018-06-14 NOTE — ANESTHESIA PREPROCEDURE EVALUATION
Review of Systems/Medical History  Patient summary reviewed  Chart reviewed  No history of anesthetic complications     Cardiovascular  Negative cardio ROS    Pulmonary  Smoker cigarette smoker  ,        GI/Hepatic  Negative GI/hepatic ROS               Endo/Other  Diabetes gestational ,      GYN  Currently pregnant , Prior pregnancy/OB history : 3 Parity: 1,          Hematology  Negative hematology ROS      Musculoskeletal  Negative musculoskeletal ROS        Neurology    Headaches,    Psychology   Depression ,              Physical Exam    Airway    Mallampati score: II  TM Distance: >3 FB  Neck ROM: full     Dental   No notable dental hx     Cardiovascular  Comment: Negative ROS, Cardiovascular exam normal    Pulmonary  Pulmonary exam normal     Other Findings        Anesthesia Plan  ASA Score- 2     Anesthesia Type- spinal with ASA Monitors  Additional Monitors:   Airway Plan:         Plan Factors-    Induction-     Postoperative Plan-     Informed Consent- Anesthetic plan and risks discussed with patient

## 2018-06-15 LAB
ERYTHROCYTE [DISTWIDTH] IN BLOOD BY AUTOMATED COUNT: 14 % (ref 11.6–15.1)
HCT VFR BLD AUTO: 29.4 % (ref 34.8–46.1)
HGB BLD-MCNC: 9.8 G/DL (ref 11.5–15.4)
MCH RBC QN AUTO: 29.4 PG (ref 26.8–34.3)
MCHC RBC AUTO-ENTMCNC: 33.3 G/DL (ref 31.4–37.4)
MCV RBC AUTO: 88 FL (ref 82–98)
PLATELET # BLD AUTO: 151 THOUSANDS/UL (ref 149–390)
PMV BLD AUTO: 11.4 FL (ref 8.9–12.7)
RBC # BLD AUTO: 3.33 MILLION/UL (ref 3.81–5.12)
RPR SER QL: NORMAL
WBC # BLD AUTO: 11.71 THOUSAND/UL (ref 4.31–10.16)

## 2018-06-15 PROCEDURE — 85027 COMPLETE CBC AUTOMATED: CPT | Performed by: OBSTETRICS & GYNECOLOGY

## 2018-06-15 RX ADMIN — SODIUM CHLORIDE, SODIUM LACTATE, POTASSIUM CHLORIDE, AND CALCIUM CHLORIDE 125 ML/HR: .6; .31; .03; .02 INJECTION, SOLUTION INTRAVENOUS at 02:00

## 2018-06-15 RX ADMIN — KETOROLAC TROMETHAMINE 30 MG: 30 INJECTION, SOLUTION INTRAMUSCULAR at 08:41

## 2018-06-15 RX ADMIN — SODIUM CHLORIDE, SODIUM LACTATE, POTASSIUM CHLORIDE, AND CALCIUM CHLORIDE 999 ML/HR: .6; .31; .03; .02 INJECTION, SOLUTION INTRAVENOUS at 01:00

## 2018-06-15 RX ADMIN — HYDROCODONE BITARTRATE AND ACETAMINOPHEN 2 TABLET: 5; 325 TABLET ORAL at 19:14

## 2018-06-15 RX ADMIN — OXYCODONE HYDROCHLORIDE AND ACETAMINOPHEN 2 TABLET: 5; 325 TABLET ORAL at 13:24

## 2018-06-15 RX ADMIN — IBUPROFEN 600 MG: 600 TABLET ORAL at 17:10

## 2018-06-15 RX ADMIN — DOCUSATE SODIUM 100 MG: 100 CAPSULE, LIQUID FILLED ORAL at 08:38

## 2018-06-15 RX ADMIN — DOCUSATE SODIUM 100 MG: 100 CAPSULE, LIQUID FILLED ORAL at 17:10

## 2018-06-15 NOTE — PROGRESS NOTES
Instructed to not let pants elastic rub or be tight over incision to get looser pants or wear a hospital gown

## 2018-06-15 NOTE — DISCHARGE INSTRUCTIONS
Section   WHAT YOU SHOULD KNOW:   A  delivery, or , is abdominal surgery to deliver your baby  There are many reasons you may need a   · A  may be scheduled before labor if you had a  with your last baby  It may be scheduled if your baby is not positioned normally, or you are pregnant with more than 1 baby  · Your caregiver may perform an emergency  during labor to prevent life-threatening complications for you or your baby  A  may be done if your cervix does not dilate after several hours of active labor  · Other reasons for a  include maternal infections and problems with the placenta  AFTER YOU LEAVE:   Medicines:   · Prescription pain medicine  may be given  Ask how to take this medicine safely  · Acetaminophen  decreases pain and fever  It is available without a doctor's order  Ask how much to take and how often to take it  Follow directions  Acetaminophen can cause liver damage if not taken correctly  · NSAIDs  help decrease swelling and pain or fever  This medicine is available with or without a doctor's order  NSAIDs can cause stomach bleeding or kidney problems in certain people  If you take blood thinner medicine, always ask your obstetrician if NSAIDs are safe for you  Always read the medicine label and follow directions  · Take your medicine as directed  Contact your obstetrician (OB) if you think your medicine is not helping or if you have side effects  Tell him if you are allergic to any medicine  Keep a list of the medicines, vitamins, and herbs you take  Include the amounts, and when and why you take them  Bring the list or the pill bottles to follow-up visits  Carry your medicine list with you in case of an emergency  Follow up with your OB as directed: You may need to return to have your stitches or staples removed   Write down your questions so you remember to ask them during your visits  Wound care:  Carefully wash your wound with soap and water every day  Keep your wound clean and dry  Wear loose, comfortable clothes that do not rub against your wound  Ask your OB about bathing and showering  Drink plenty of liquids: You can lower your risk for a blood clot if you drink plenty of liquids  Ask how much liquid to drink each day and which liquids are best for you  Limit activity until you have fully recovered from surgery:   · Ask when it is safe for you to drive, walk up stairs, lift heavy objects, and have sex  · Ask when it is okay to exercise, and what types of exercise to do  Start slowly and do more as you get stronger  Contact your OB if:   · You have heavy vaginal bleeding that fills 1 or more sanitary pads in 1 hour  · You have a fever  · Your incision is swollen, red, or draining pus  · You have questions or concerns about yourself or your baby  Seek care immediately or call 911 if:   · Blood soaks through your bandage  · Your stitches come apart  · You feel lightheaded, short of breath, and have chest pain  · You cough up blood  · Your arm or leg feels warm, tender, and painful  It may look swollen and red  © 2014 [address] is for End User's use only and may not be sold, redistributed or otherwise used for commercial purposes  All illustrations and images included in CareNotes® are the copyrighted property of A D A M [employer] Inc  or [employer]  The above information is an  only  It is not intended as medical advice for individual conditions or treatments  Talk to your doctor, nurse or pharmacist before following any medical regimen to see if it is safe and effective for you

## 2018-06-15 NOTE — PROGRESS NOTES
Progress Note - OB/GYN   Cecelia Mohan 29 y o  female MRN: [id number]  Unit/Bed#: L&D 312-01 Encounter: [id number]      Cecelia Mohan is a patient of RLTCS + BTL    Assessment:  Post operative day #1 s/p RLTCS and BTL, stable, and doing well today  Her blood pressure was slightly low overnight, however it improved with IV fluids  Plan:  1  Rubella non-immune   - MMR ordered postpartum  2  Hemodynamically stable   - Pre-op Hb 11 8 --> post-op Hb 9 8   - Vitals WNL, currently asymptomatic  3  Castro catheter   - To be removed this AM   - Making adequate urine output overnight   - F/u voiding trial after castro removal  4  Continue routine post partum care   - Encourage ambulation   - Encourage breastfeeding  5  Continue current meds   - See list below   - Pain adequately controlled with PO analgesics  6  Disposition   - Stable   - Anticipate discharge home POD#3  7     - Fetal renal anomaly, L multicystic dysplastic kidney   - F/u neonatology      Subjective/Objective     Chief Complaint:     Post operative day #1 from a RLTCS with no complaints today    Subjective:     Pain: no  Tolerating Oral Intake: yes  Voiding: yes  Flatus: yes  Bowel Movement: no  Ambulating: no  Breastfeeding: Breastfeeding  Chest Pain: no  Shortness of Breath: no  Leg Pain/Discomfort: no  Lochia: minimal    Objective:   Vitals: /62 (BP Location: Right arm)   Pulse 65   Temp 98 2 °F (36 8 °C) (Oral)   Resp 16   Ht 5' 1" (1 549 m)   Wt 83 5 kg (184 lb)   LMP 2017 (LMP Unknown)   SpO2 93%   Breastfeeding?  Unknown   BMI 34 77 kg/m²       Intake/Output Summary (Last 24 hours) at 06/15/18 0646  Last data filed at 06/15/18 0601   Gross per 24 hour   Intake             2050 ml   Output             1825 ml   Net              225 ml       Lab Results   Component Value Date    WBC 11 71 (H) 06/15/2018    HGB 9 8 (L) 06/15/2018    HCT 29 4 (L) 06/15/2018    MCV 88 06/15/2018     06/15/2018       Meds/Allergies Current Facility-Administered Medications   Medication Dose Route Frequency    acetaminophen (TYLENOL) tablet 650 mg  650 mg Oral Q4H PRN    dexamethasone (PF) (DECADRON) injection 8 mg  8 mg Intravenous Once PRN    diphenhydrAMINE (BENADRYL) injection 25 mg  25 mg Intravenous Q6H PRN    diphenhydrAMINE (BENADRYL) tablet 25 mg  25 mg Oral Q6H PRN    docusate sodium (COLACE) capsule 100 mg  100 mg Oral BID    HYDROcodone-acetaminophen (NORCO) 5-325 mg per tablet 1 tablet  1 tablet Oral Q4H PRN    HYDROcodone-acetaminophen (NORCO) 5-325 mg per tablet 2 tablet  2 tablet Oral Q4H PRN    ibuprofen (MOTRIN) tablet 600 mg  600 mg Oral Q6H PRN    ketorolac (TORADOL) injection 30 mg  30 mg Intravenous Q6H    measles-mumps-rubella (M-M-R II) subcutaneous injection 0 5 mL  0 5 mL Subcutaneous Once    metoclopramide (REGLAN) injection 5 mg  5 mg Intravenous Q6H PRN    naloxone (NARCAN) injection 0 1 mg  0 1 mg Intravenous Q3 min PRN    ondansetron (ZOFRAN) injection 4 mg  4 mg Intravenous Q4H PRN    ondansetron (ZOFRAN) injection 4 mg  4 mg Intravenous Q8H PRN    oxyCODONE-acetaminophen (PERCOCET) 5-325 mg per tablet 2 tablet  2 tablet Oral Q6H PRN    simethicone (MYLICON) chewable tablet 80 mg  80 mg Oral 4x Daily PRN       Physical Exam:  General: in no apparent distress and well developed and well nourished  Cardiovascular: Cor RRR  Lungs: clear to auscultation bilaterally  Abdomen: abdomen is soft without significant tenderness, masses, organomegaly or guarding; incision c/d/i closed with running absorbable suture  Fundus: Firm and non-tender, 1 cm above the umbilicus  Lower extremeties: nontender, SCDs in place b/l    Labs/Tests:   Recent Results (from the past 24 hour(s))   Type and screen and continue to monitor patient    Collection Time: 06/14/18 10:54 AM   Result Value Ref Range    ABO Grouping O     Rh Factor Positive     Antibody Screen Negative     Specimen Expiration Date [id number]    CBC and differential    Collection Time: 06/14/18 10:54 AM   Result Value Ref Range    WBC 12 90 (H) 4 31 - 10 16 Thousand/uL    RBC 3 96 3 81 - 5 12 Million/uL    Hemoglobin 11 8 11 5 - 15 4 g/dL    Hematocrit 34 5 (L) 34 8 - 46 1 %    MCV 87 82 - 98 fL    MCH 29 8 26 8 - 34 3 pg    MCHC 34 2 31 4 - 37 4 g/dL    RDW 14 0 11 6 - 15 1 %    MPV 11 4 8 9 - 12 7 fL    Platelets 086 404 - 526 Thousands/uL    nRBC 0 /100 WBCs    Neutrophils Relative 73 43 - 75 %    Lymphocytes Relative 22 14 - 44 %    Monocytes Relative 5 4 - 12 %    Eosinophils Relative 1 0 - 6 %    Basophils Relative 0 0 - 1 %    Neutrophils Absolute 9 36 (H) 1 85 - 7 62 Thousands/µL    Lymphocytes Absolute 2 80 0 60 - 4 47 Thousands/µL    Monocytes Absolute 0 61 0 17 - 1 22 Thousand/µL    Eosinophils Absolute 0 11 0 00 - 0 61 Thousand/µL    Basophils Absolute 0 02 0 00 - 0 10 Thousands/µL   Blood gas, venous, cord    Collection Time: 06/14/18  2:15 PM   Result Value Ref Range    pH, Cord Adiel 7 340 7 190 - 7 490    pCO2, Cord Adiel 42 3 27 0 - 43 0 mm HG    pO2, Cord Adiel 29 6 15 0 - 45 0 mm HG    HCO3, Cord Adiel 22 3 12 2 - 28 6 mmol/L    Base Exc, Cord Adiel -3 4 (L) 1 0 - 9 0 mmol/L    O2 Cont, Cord Adiel 18 5 mL/dL    O2 HGB,VENOUS CORD 67 5 %   Blood gas, arterial, cord    Collection Time: 06/14/18  2:15 PM   Result Value Ref Range    pH, Cord Art 7 292 7 230 - 7 430    pCO2, Cord Art 54 6 30 0 - 60 0    pO2, Cord Art 17 7 5 0 - 25 0 mm HG    HCO3, Cord Art 25 8 17 3 - 27 3 mmol/L    Base Exc, Cord Art -2 1 (L) 3 0 - 11 0 mmol/L    O2 Content, Cord Art 11 0 ml/dl    O2 Hgb, Arterial Cord 41 2 %   CBC    Collection Time: 06/15/18  5:08 AM   Result Value Ref Range    WBC 11 71 (H) 4 31 - 10 16 Thousand/uL    RBC 3 33 (L) 3 81 - 5 12 Million/uL    Hemoglobin 9 8 (L) 11 5 - 15 4 g/dL    Hematocrit 29 4 (L) 34 8 - 46 1 %    MCV 88 82 - 98 fL    MCH 29 4 26 8 - 34 3 pg    MCHC 33 3 31 4 - 37 4 g/dL    RDW 14 0 11 6 - 15 1 %    Platelets 455 366 - 186 Thousands/uL    MPV 11 4 8 9 - 12 7 fL       MEDS:   Current Facility-Administered Medications   Medication Dose Route Frequency    acetaminophen (TYLENOL) tablet 650 mg  650 mg Oral Q4H PRN    dexamethasone (PF) (DECADRON) injection 8 mg  8 mg Intravenous Once PRN    diphenhydrAMINE (BENADRYL) injection 25 mg  25 mg Intravenous Q6H PRN    diphenhydrAMINE (BENADRYL) tablet 25 mg  25 mg Oral Q6H PRN    docusate sodium (COLACE) capsule 100 mg  100 mg Oral BID    HYDROcodone-acetaminophen (NORCO) 5-325 mg per tablet 1 tablet  1 tablet Oral Q4H PRN    HYDROcodone-acetaminophen (NORCO) 5-325 mg per tablet 2 tablet  2 tablet Oral Q4H PRN    ibuprofen (MOTRIN) tablet 600 mg  600 mg Oral Q6H PRN    ketorolac (TORADOL) injection 30 mg  30 mg Intravenous Q6H    measles-mumps-rubella (M-M-R II) subcutaneous injection 0 5 mL  0 5 mL Subcutaneous Once    metoclopramide (REGLAN) injection 5 mg  5 mg Intravenous Q6H PRN    naloxone (NARCAN) injection 0 1 mg  0 1 mg Intravenous Q3 min PRN    ondansetron (ZOFRAN) injection 4 mg  4 mg Intravenous Q4H PRN    ondansetron (ZOFRAN) injection 4 mg  4 mg Intravenous Q8H PRN    oxyCODONE-acetaminophen (PERCOCET) 5-325 mg per tablet 2 tablet  2 tablet Oral Q6H PRN    simethicone (MYLICON) chewable tablet 80 mg  80 mg Oral 4x Daily PRN     Invasive Devices     Peripheral Intravenous Line            Peripheral IV 06/14/18 Left Wrist less than 1 day          Drain            Urethral Catheter Latex 16 Fr  less than 1 day                Godwin Grant DO  PGY-1 OB/GYN   6/15/2018 6:46 AM

## 2018-06-15 NOTE — PLAN OF CARE

## 2018-06-15 NOTE — SOCIAL WORK
CM met with MOb and Lenin CHRISTIANSON, to do a general SW assessment  MOB gave birth to a baby boy, Mali Beltran  Family has 3yo daughter at home  Parents report having all necessary items for baby at discharge  MOB is formula feeding and has [address] services, next appt next Wednesday  PNC provided through [address] [place] [address] Way  Baby will be enrolled in [facility], mom already called ot notify insurance of delivery  Parents plan to use [facility] for ped needs  Mom has MH Hx of depression in high school, nothing recently diagnosed, no hx of PPD with other pregnancy  No social concerns identified

## 2018-06-15 NOTE — PROGRESS NOTES
Post Op Check  Rajiv Dean 29 y o  female MRN: [id number]  Unit/Bed#: L&D 312-01 Encounter: [id number]    Subjective:  Pt doing well, no complaints  Has not been out of bed  Albert catheter in place  States pain is controlled  Lochia minimal  Denies chest pain, SOB, calf pain  Passing flatus, no BM yet  BP (!) 85/62 (BP Location: Right arm) Comment: dr aware- another bouls being given   Pulse 65   Temp 97 7 °F (36 5 °C) (Oral)   Resp 16   Ht 5' 1" (1 549 m)   Wt 83 5 kg (184 lb)   LMP 2017 (LMP Unknown)   SpO2 93%   Breastfeeding? Unknown   BMI 34 77 kg/m²     I/O this shift:  In: -   Out: 200 [Urine:200]    Lab Results   Component Value Date    WBC 12 90 (H) 2018    HGB 11 8 2018    HCT 34 5 (L) 2018    MCV 87 2018     2018       Lab Results   Component Value Date    GLUCOSE 118 2015    CALCIUM 9 0 2015     2015    K 3 7 2015    CO2 21 8 2015     2015    BUN 10 2015    CREATININE 0 73 2015       Physical Exam  Gen: NAD, laying in bed, AA&Ox3  CVS: RRR, no m/r/g  Lungs:wheezing bilaterally  Abdomen:Soft, non-tender, positive bowel sounds  Incision: right corner with minimal oozing, otherwise intact  Steri strips placed  No signs of infection  Extremities:non-tender, SCDs in place    A/P:POD#1 s/p RLTCS and BTL, stable  Uterine anomaly: unicornate uterus  Fetal anomaly: left multi-cystic dysplastic kidney and right pyelectasis--> f/u 48hr and 1 week   Tobacco use: refused patch    1)Continue routine post-op care  Albert catheter in place with adequate UOP, remove in AM and f/u voiding trial  Pre-op hgb 11 8--> f/u AM CBC  Diet: advance as tolerated   DVT PPx: SCDs  Encouraged incentive spirometry to reduce atelectasis and pneumonia risk  Encouraged ambulation as tolerated  2) Hypotension post-op s/p IVF bolus, repeat /63   VSS, pt asymptomatic, UOP adequate  Continue to monitor  3) Rubella non-immune: MMR ordered      Late post-op check due to direct patient care  Cammie Whitt MD  OBGYN, PGY-1  6/15/2018 2:41 AM

## 2018-06-16 VITALS
HEIGHT: 61 IN | DIASTOLIC BLOOD PRESSURE: 85 MMHG | SYSTOLIC BLOOD PRESSURE: 134 MMHG | BODY MASS INDEX: 34.74 KG/M2 | OXYGEN SATURATION: 98 % | TEMPERATURE: 98.1 F | RESPIRATION RATE: 18 BRPM | HEART RATE: 95 BPM | WEIGHT: 184 LBS

## 2018-06-16 PROCEDURE — 99024 POSTOP FOLLOW-UP VISIT: CPT | Performed by: OBSTETRICS & GYNECOLOGY

## 2018-06-16 RX ORDER — HYDROCODONE BITARTRATE AND ACETAMINOPHEN 5; 325 MG/1; MG/1
1-2 TABLET ORAL EVERY 6 HOURS PRN
Qty: 20 TABLET | Refills: 0 | Status: SHIPPED | OUTPATIENT
Start: 2018-06-16 | End: 2018-06-26

## 2018-06-16 RX ORDER — DOCUSATE SODIUM 100 MG/1
100 CAPSULE, LIQUID FILLED ORAL 2 TIMES DAILY
Qty: 10 CAPSULE | Refills: 0 | Status: SHIPPED | OUTPATIENT
Start: 2018-06-16 | End: 2018-06-26

## 2018-06-16 RX ORDER — IBUPROFEN 600 MG/1
600 TABLET ORAL EVERY 6 HOURS PRN
Qty: 30 TABLET | Refills: 0 | Status: SHIPPED | OUTPATIENT
Start: 2018-06-16 | End: 2020-06-10

## 2018-06-16 RX ORDER — ACETAMINOPHEN 325 MG/1
TABLET ORAL
Qty: 30 TABLET | Refills: 0 | Status: SHIPPED | OUTPATIENT
Start: 2018-06-16 | End: 2018-06-26

## 2018-06-16 RX ADMIN — IBUPROFEN 600 MG: 600 TABLET ORAL at 06:30

## 2018-06-16 RX ADMIN — HYDROCODONE BITARTRATE AND ACETAMINOPHEN 1 TABLET: 5; 325 TABLET ORAL at 14:52

## 2018-06-16 NOTE — DISCHARGE SUMMARY
Discharge Summary -   Carlos Eduardo Reis 29 y o  female MRN: [id number]  Unit/Bed#: L&D 312-01 Encounter: [id number]    Admission Date: 2018     Discharge Date: 2018    Delivering Attending: Jair Pratt MD  Discharging Attending: Dr Effie Mac    Principal Diagnosis:   Term pregnancy at 39w0d  Single fetus  Hx previous  section  Desire for repeat  section  Rubella non-immune  Fetal renal anomaly    Secondary Diagnosis:   Same as above, delivered    Procedures: Repeat  section with Tubal Ligation    Hospital Course:   Carlos Eduardo Reis is a 29 y o  [id number] at 39w0d who was initially admitted for scheduled repeat  section with bilateral tubal ligation  She delivered a viable male  on 18 at [phone]  Weight 5lbs 15oz via repeat  section, low transverse incision  Apgars were 8 (1 min) and 9 (5 min)   was transferred to  nursery  Patient tolerated the procedure well and was transferred to recovery in stable condition  Her post-operative course was uncomplicated  Preoperative hemaglobin was 11 8, postoperative was 9 8  Her postoperative pain was well controlled with oral analgesics  On day of discharge, she was ambulating and able to reasonably perform all ADLs  She was voiding and had appropriate bowel function  Pain was well controlled  She was discharged home on post-operative day #2 without complications  Patient was instructed to follow up with her OB as an outpatient and was given appropriate warnings to call provider if she develops signs of infection or uncontrolled pain  Complications: None    Condition at discharge: stable     Discharge Medications: For a complete list of the patient's medications, please refer to her medical reconcillation report  Discharge instructions/Information to patient and family:   See after visit summary for information provided to patient and family        Provisions for Follow-Up Care:  See after visit summary for information related to follow-up care and any pertinent home health orders  Disposition: See After Visit Summary for discharge disposition information      Planned Readmission: Sylvia Disla DO  OB/GYN, PGY2  6/16/2018, 5:22 PM

## 2018-06-16 NOTE — PLAN OF CARE
Problem: PAIN - ADULT  Goal: Verbalizes/displays adequate comfort level or baseline comfort level  Interventions:  - Encourage patient to monitor pain and request assistance  - Assess pain using appropriate pain scale  - Administer analgesics based on type and severity of pain and evaluate response  - Implement non-pharmacological measures as appropriate and evaluate response  - Consider cultural and social influences on pain and pain management  - Notify physician/advanced practitioner if interventions unsuccessful or patient reports new pain   Outcome: Progressing      Problem: INFECTION - ADULT  Goal: Absence or prevention of progression during hospitalization  INTERVENTIONS:  - Assess and monitor for signs and symptoms of infection  - Monitor lab/diagnostic results  - Monitor all insertion sites, i e  indwelling lines, tubes, and drains  - Monitor endotracheal (as able) and nasal secretions for changes in amount and color  - [place] appropriate cooling/warming therapies per order  - Administer medications as ordered  - Instruct and encourage patient and family to use good hand hygiene technique  - Identify and instruct in appropriate isolation precautions for identified infection/condition   Outcome: Progressing    Goal: Absence of fever/infection during neutropenic period  INTERVENTIONS:  - Monitor WBC  - Implement neutropenic guidelines   Outcome: Progressing      Problem: SAFETY ADULT  Goal: Patient will remain free of falls  INTERVENTIONS:  - Assess patient frequently for physical needs  -  Identify cognitive and physical deficits and behaviors that affect risk of falls    -  [place] fall precautions as indicated by assessment   - Educate patient/family on patient safety including physical limitations  - Instruct patient to call for assistance with activity based on assessment  - Modify environment to reduce risk of injury  - Consider OT/PT consult to assist with strengthening/mobility   Outcome: Progressing    Goal: Maintain or return to baseline ADL function  INTERVENTIONS:  -  Assess patient's ability to carry out ADLs; assess patient's baseline for ADL function and identify physical deficits which impact ability to perform ADLs (bathing, care of mouth/teeth, toileting, grooming, dressing, etc )  - Assess/evaluate cause of self-care deficits   - Assess range of motion  - Assess patient's mobility; develop plan if impaired  - Assess patient's need for assistive devices and provide as appropriate  - Encourage maximum independence but intervene and supervise when necessary  ¯ Involve family in performance of ADLs  ¯ Assess for home care needs following discharge   ¯ Request OT consult to assist with ADL evaluation and planning for discharge  ¯ Provide patient education as appropriate   Outcome: Progressing    Goal: Maintain or return mobility status to optimal level  INTERVENTIONS:  - Assess patient's baseline mobility status (ambulation, transfers, stairs, etc )    - Identify cognitive and physical deficits and behaviors that affect mobility  - Identify mobility aids required to assist with transfers and/or ambulation (gait belt, sit-to-stand, lift, walker, cane, etc )  - [place] fall precautions as indicated by assessment  - Record patient progress and toleration of activity level on Mobility SBAR; progress patient to next Phase/Stage  - Instruct patient to call for assistance with activity based on assessment  - Request Rehabilitation consult to assist with strengthening/weightbearing, etc    Outcome: Progressing      Problem: Knowledge Deficit  Goal: Patient/family/caregiver demonstrates understanding of disease process, treatment plan, medications, and discharge instructions  Complete learning assessment and assess knowledge base    Interventions:  - Provide teaching at level of understanding  - Provide teaching via preferred learning methods   Outcome: Progressing      Problem: DISCHARGE PLANNING  Goal: Discharge to home or other facility with appropriate resources  INTERVENTIONS:  - Identify barriers to discharge w/patient and caregiver  - Arrange for needed discharge resources and transportation as appropriate  - Identify discharge learning needs (meds, wound care, etc )  - Arrange for interpretive services to assist at discharge as needed  - Refer to Case Management Department for coordinating discharge planning if the patient needs post-hospital services based on physician/advanced practitioner order or complex needs related to functional status, cognitive ability, or social support system   Outcome: Progressing      Problem: BIRTH - VAGINAL/ SECTION  Goal: Fetal and maternal status remain reassuring during the birth process  INTERVENTIONS:  - Monitor vital signs  - Monitor fetal heart rate  - Monitor uterine activity  - Monitor labor progression (vaginal delivery)  - DVT prophylaxis  - Antibiotic prophylaxis   Outcome: Progressing    Goal: Emotionally satisfying birthing experience for mother/fetus  Interventions:  - Assess, plan, implement and evaluate the nursing care given to the patient in labor  - Advocate the philosophy that each childbirth experience is a unique experience and support the family's chosen level of involvement and control during the labor process   - Actively participate in both the patient's and family's teaching of the birth process  - Consider cultural, Zoroastrian and age-specific factors and plan care for the patient in labor   Outcome: Progressing      Problem: POSTPARTUM  Goal: Experiences normal postpartum course  INTERVENTIONS:  - Monitor maternal vital signs  - Assess uterine involution and lochia   Outcome: Progressing    Goal: Appropriate maternal -  bonding  INTERVENTIONS:  - Identify family support  - Assess for appropriate maternal/infant bonding   -Encourage maternal/infant bonding opportunities  - Referral to  or  as needed   Outcome: Progressing    Goal: Establishment of infant feeding pattern  INTERVENTIONS:  - Assess breast/bottle feeding  - Refer to lactation as needed   Outcome: Progressing    Goal: Incision(s), wounds(s) or drain site(s) healing without S/S of infection  INTERVENTIONS  - Assess and document risk factors for skin impairment   - Assess and document dressing, incision, wound bed, drain sites and surrounding tissue  - Initiate Nutrition services consult and/or wound management as needed   Outcome: Progressing

## 2018-06-16 NOTE — PLAN OF CARE
BIRTH - VAGINAL/ SECTION     Fetal and maternal status remain reassuring during the birth process Completed     Emotionally satisfying birthing experience for mother/fetus Completed        DISCHARGE PLANNING     Discharge to home or other facility with appropriate resources Completed        INFECTION - ADULT     Absence or prevention of progression during hospitalization Completed     Absence of fever/infection during neutropenic period Completed        Knowledge Deficit     Patient/family/caregiver demonstrates understanding of disease process, treatment plan, medications, and discharge instructions Completed        PAIN - ADULT     Verbalizes/displays adequate comfort level or baseline comfort level Completed        POSTPARTUM     Experiences normal postpartum course Completed     Appropriate maternal -  bonding Completed     Establishment of infant feeding pattern Completed     Incision(s), wounds(s) or drain site(s) healing without S/S of infection Completed        SAFETY ADULT     Patient will remain free of falls Completed     Maintain or return to baseline ADL function Completed     Maintain or return mobility status to optimal level Completed

## 2018-06-16 NOTE — PROGRESS NOTES
Progress Note - OB/GYN   Ruthie Covington 29 y o  female MRN: [id number]  Unit/Bed#: L&D 312-01 Encounter: [id number]    Assessment:  Term pregnancy, s/p RLTCS + BTL, POD#2    Plan:  RLTCS + BTL: POD#2  Cont routine postpartum care  Encourage ambulation  Encourage increased PO water intake  Pain control as needed  Rubella nonimmune: MMR ordered  Pt desires early discharge to home today  Pending  kidney ultrasound and bilirubin levels    Subjective/Objective   Chief Complaint: I'm ready to go    Subjective: Pt reports feeling well today  Have some soreness with ambulating  Tolerating PO  Voiding without difficulty  No BM yet but passing flatus  Lochia improving  Denies HA, CP, SOB, extremity pain  Objective:     Vitals: Blood pressure 135/87, pulse 96, temperature 98 2 °F (36 8 °C), temperature source Oral, resp  rate 18, height 5' 1" (1 549 m), weight 83 5 kg (184 lb), last menstrual period 2017, SpO2 96 %, not currently breastfeeding  ,Body mass index is 34 77 kg/m²  Intake/Output Summary (Last 24 hours) at 18 0921  Last data filed at 06/15/18 2040   Gross per 24 hour   Intake                0 ml   Output              750 ml   Net             -750 ml       Invasive Devices          No matching active lines, drains, or airways          Physical Exam:   Gen: AaOx3, NAD, pleasant  Pulm: No increased work of breathing  Abd: Soft, nontender, nondistended  Incision is clean, dry, and intact  Small amount of warmth around incision, however, may be secondary to pannus  : Fundus firm, appropriately tender, located at -1 cm below umbilicus  Extremities: No edema, nontender, Negative Hugh's bilaterally      Lab, Imaging and other studies: I have personally reviewed pertinent reports              Andrew Parks,   OB/GYN, PGY2  2018, 9:23 AM

## 2018-06-18 ENCOUNTER — TELEPHONE (OUTPATIENT)
Dept: OBGYN CLINIC | Facility: MEDICAL CENTER | Age: 33
End: 2018-06-18

## 2018-06-18 NOTE — CASE MANAGEMENT
Notification of Maternity Inpatient Admission/Maternity Inpatient Authorization Request  This is a Notification of Maternity Inpatient Admission/Maternity Inpatient Authorization Request to our facility [facility]  Please be advised that this patient is currently in our facility under Inpatient Status  Below you will find the Birth/[place] Summary, Attending Physician and Facilitys information including NPI# and contact for the Utilization  assigned to the [facility] where the patient is receiving services  Please feel free to contact the Utilization Review Department with any questions  Mothers Information:  Timbo Oleary  MRN: [id number]  YOB: 1985  Admission Date: 2018 10:00 AM  Discharge Date: 2018  8:00 PM  Disposition: Home/Self Care  Admitting Diagnosis: Encounter for  delivery without indication [O82]   Information:  Estimated Date of Delivery: 18  Information for the patient's :  Susie Mora) [[id number]]      Delivery Information:  Sex: male  Delivered 2018 2:43 PM by , Low Transverse; Gestational Age: 36w0d     Measurements:  Weight: 5 lb 15 oz (2693 g); Height: 19"    APGAR 1 minute 5 minutes 10 minutes   Totals: 8 9      OB History      Para Term  AB Living    3 2 2 0 1 2    SAB TAB Ectopic Multiple Live Births          0 1        Obstetric Comments    Pt here today for OB Ed, Consents signed, PN panel was drawn and the Quad Screen  Pt could not leave a urine speciman  1st ob visit has been set up with Dr Chance Fernandez on 2018 2:15pm also to get early GTT due to gestational diabetes        Attending Physician:  BISHOP Joe    Specialty- Obstetrics and Gynecology  [address] [id number]  [address] [place] [address], [address]   Phone 1: [phone]  Fax: [phone]    [facility]: [facility]  [address] [place] [address], [facility]  [phone]  Tax ID: [id number]  NPI: [id number]    [address] [place] [address] in the [facility] by Prince Valdes for 2017  Network Utilization Review Department  Phone: [phone]; Fax [phone]  ATTENTION: The Network Utilization Review Department is now centralized for our 7 Facilities  Make a note that we have a new phone and fax numbers for our Department  Please call with any questions or concerns to [phone] and carefully follow the prompts so that you are directed to the right person  All voicemails are confidential  Fax any determinations, approvals, denials, and requests for initial or continue stay review clinical to [phone]  **Due to HIGH CALL volume, it would be easier if you could please send daily logs  This will expedite your requests and in part, help us provide discharge notifications faster   **

## 2018-06-18 NOTE — TELEPHONE ENCOUNTER
The patient called over the weekend and spoke to Dr Hayes Judd about her pain medication  Dr Hayes Judd did speak with her and she wanted to see how that patient was doing  I had to leave a message for the patient to call back

## 2018-06-19 ENCOUNTER — TELEPHONE (OUTPATIENT)
Dept: OBGYN CLINIC | Facility: MEDICAL CENTER | Age: 33
End: 2018-06-19

## 2018-06-19 DIAGNOSIS — Z3A.38 38 WEEKS GESTATION OF PREGNANCY: ICD-10-CM

## 2018-06-19 DIAGNOSIS — Z3A.39 39 WEEKS GESTATION OF PREGNANCY: ICD-10-CM

## 2018-06-20 ENCOUNTER — TELEPHONE (OUTPATIENT)
Dept: OBGYN CLINIC | Facility: MEDICAL CENTER | Age: 33
End: 2018-06-20

## 2018-06-22 LAB — PLACENTA IN STORAGE: NORMAL

## 2018-06-25 ENCOUNTER — TELEPHONE (OUTPATIENT)
Dept: NEPHROLOGY | Facility: CLINIC | Age: 33
End: 2018-06-25

## 2018-06-25 NOTE — TELEPHONE ENCOUNTER
----- Message from Kolton Hester MD sent at 6/25/2018  8:59 AM EDT -----  Can we reach out to mom to have baby scheduled for initial consult?   (We saw mom prenatally)

## 2018-06-26 ENCOUNTER — OFFICE VISIT (OUTPATIENT)
Dept: OBGYN CLINIC | Facility: CLINIC | Age: 33
End: 2018-06-26

## 2018-06-26 VITALS — DIASTOLIC BLOOD PRESSURE: 72 MMHG | BODY MASS INDEX: 32.65 KG/M2 | SYSTOLIC BLOOD PRESSURE: 116 MMHG | WEIGHT: 172.8 LBS

## 2018-06-26 DIAGNOSIS — Z98.890 POST-OPERATIVE STATE: Primary | ICD-10-CM

## 2018-06-26 PROCEDURE — 99024 POSTOP FOLLOW-UP VISIT: CPT | Performed by: NURSE PRACTITIONER

## 2018-06-26 RX ORDER — TRAMADOL HYDROCHLORIDE 50 MG/1
50 TABLET ORAL EVERY 6 HOURS PRN
COMMUNITY
End: 2020-06-10

## 2018-06-26 NOTE — PROGRESS NOTES
Assessment Amirah oHff was seen today for follow-up  Diagnoses and all orders for this visit:    Post-operative state         Plan  Patient is doing well  Denies depression  Lives with her parents, naps when baby sleeps  Pt to f/u in 5 weeks  [facility] Jolene Boo   Viviane Pat is a 29 y o  female here for a postop visit  Patient has no c/o  Marcine Grow Patient had a rltc/s with btl on   Tapan Escobar is bottlefeeding, gaining wt and doing good  Is playing "phone [employer]" [employer] Dr Ciarra Dow office, needs to set up apt for baby to be seen for renal anormaly    Patient Active Problem List   Diagnosis    History of  section, low transverse    Unicornuate uterus    Obesity (BMI 30-39  9)    Rubella non-immune status, antepartum    Fetal renal anomaly    Obesity complicating pregnancy    38 weeks gestation of pregnancy    39 weeks gestation of pregnancy    Delivery of pregnancy by  section       Gynecologic History  Patient's last menstrual period was 2017 (lmp unknown)  The current method of family planning is tubal ligation      Past Medical History:   Diagnosis Date    Cancer [facility])     sister with lukemia maternal grandmother passed with skin cancer     Depression     after miscarriage    Gestational diabetes     pregnancy with daughter    Migraine     Suspected problem with fetal growth not found     last assessed 14     Past Surgical History:   Procedure Laterality Date     SECTION  10/10/2014    due to daughter being breech    FOOT SURGERY Left     [place]  DELIVERY ONLY N/A 2018    Procedure:  SECTION () REPEAT;  Surgeon: Vanessa Mello MD;  Location: [place] ;  Service: Obstetrics    TUBAL LIGATION N/A 2018    Procedure: LIGATION/COAGULATION TUBAL;  Surgeon: Vanessa Mello MD;  [facility];  Service: Obstetrics     Family History   Problem Relation Age of Onset    Heart defect Mother         cardiac disorder    Leukemia Sister     Cancer Family     Diabetes Family     Leukemia Family     Parkinsonism Family     No Known Problems Father     No Known Problems Brother     No Known Problems Daughter     No Known Problems Sister     No Known Problems Sister     No Known Problems Sister     No Known Problems Sister     No Known Problems Sister     No Known Problems Sister     No Known Problems Sister     No Known Problems Sister     Cleft palate Brother     No Known Problems Brother     No Known Problems Brother     No Known Problems Brother      Social History     Social History    Marital status: Single     Spouse name: N/A    Number of children: N/A    Years of education: N/A     Occupational History    Not on file  Social History Main Topics    Smoking status: Current Every Day Smoker     Packs/day: 0 20     Types: Cigarettes    Smokeless tobacco: Never Used      Comment: 4/5 cigarettes a day    Alcohol use No    Drug use: No      Comment: using marijuana as per [employer]    Sexual activity: Yes     Partners: Male     Other Topics Concern    Not on file     Social History Narrative    No narrative on file     Allergies   Allergen Reactions    Amoxicillin Anaphylaxis    Penicillins Anaphylaxis    Codeine Nausea Only     itching       Current Outpatient Prescriptions:     ibuprofen (MOTRIN) 600 mg tablet, Take 1 tablet (600 mg total) by mouth every 6 (six) hours as needed for mild pain, Disp: 30 tablet, Rfl: 0    traMADol (ULTRAM) 50 mg tablet, Take 50 mg by mouth every 6 (six) hours as needed for moderate pain, Disp: , Rfl:     Review of Systems  Constitutional :no fever, feels well, no tiredness, no recent weight gain or loss  ENT: no ear ache, no loss of hearing, no nosebleeds or nasal discharge, no sore throat or hoarseness    Cardiovascular: no complaints of slow or fast heart beat, no chest pain, no palpitations, no leg claudication or lower extremity edema  Respiratory: no complaints of shortness of shortness of breath, no CALLE  Breasts:no complaints of breast pain, breast lump, or nipple discharge  Gastrointestinal: no complaints of abdominal pain, constipation,nausea, vomiting, or diarrhea or bloody stools  Genitourinary : no complaints of dysuria, incontinence, pelvic pain, no dysmenorrhea, vaginal discharge or abnormal vaginal bleeding and as noted in HPI  Integumentary: no complaints of skin rash or lesion, itching or dry skin  Neurological: no complaints of headache, no confusion, no numbness or tingling, no dizziness or fainting     Objective     /72   Wt 78 4 kg (172 lb 12 8 oz)   LMP 11/11/2017 (LMP Unknown)   BMI 32 65 kg/m²     General:   appears stated age, cooperative, alert normal mood and affect   Abdomen: soft, non-tender, inc  C/d/intact

## 2018-08-09 ENCOUNTER — TELEPHONE (OUTPATIENT)
Dept: OBGYN CLINIC | Facility: MEDICAL CENTER | Age: 33
End: 2018-08-09

## 2018-08-09 NOTE — LETTER
August 9, 2018     Patient: Ruthie Bowman   YOB: 1985   Date of Visit: 8/9/2018       To Whom it May Concern:    Jonathan Valderrama is under my professional care  She was seen in my office on 8/9/2018  She may return to work on 8/10/2018  No restrictions    If you have any questions or concerns, please don't hesitate to call           Sincerely,          Mena Esteban RN        CC: No Recipients

## 2020-06-10 ENCOUNTER — OFFICE VISIT (OUTPATIENT)
Dept: FAMILY MEDICINE CLINIC | Facility: CLINIC | Age: 35
End: 2020-06-10
Payer: COMMERCIAL

## 2020-06-10 VITALS
BODY MASS INDEX: 30.56 KG/M2 | WEIGHT: 179 LBS | TEMPERATURE: 98.3 F | OXYGEN SATURATION: 97 % | RESPIRATION RATE: 18 BRPM | HEIGHT: 64 IN | DIASTOLIC BLOOD PRESSURE: 82 MMHG | SYSTOLIC BLOOD PRESSURE: 126 MMHG | HEART RATE: 89 BPM

## 2020-06-10 DIAGNOSIS — K08.89 PAIN, DENTAL: ICD-10-CM

## 2020-06-10 DIAGNOSIS — Z76.89 RETURN TO WORK EVALUATION: Primary | ICD-10-CM

## 2020-06-10 PROCEDURE — T1015 CLINIC SERVICE: HCPCS | Performed by: FAMILY MEDICINE

## 2020-06-10 PROCEDURE — 99213 OFFICE O/P EST LOW 20 MIN: CPT | Performed by: FAMILY MEDICINE

## 2020-06-16 ENCOUNTER — APPOINTMENT (EMERGENCY)
Dept: RADIOLOGY | Facility: HOSPITAL | Age: 35
End: 2020-06-16
Payer: COMMERCIAL

## 2020-06-16 ENCOUNTER — HOSPITAL ENCOUNTER (EMERGENCY)
Facility: HOSPITAL | Age: 35
Discharge: HOME | End: 2020-06-16
Attending: EMERGENCY MEDICINE
Payer: COMMERCIAL

## 2020-06-16 VITALS
RESPIRATION RATE: 18 BRPM | HEIGHT: 62 IN | HEART RATE: 89 BPM | DIASTOLIC BLOOD PRESSURE: 87 MMHG | TEMPERATURE: 98.1 F | SYSTOLIC BLOOD PRESSURE: 130 MMHG | WEIGHT: 169.75 LBS | OXYGEN SATURATION: 98 % | BODY MASS INDEX: 31.24 KG/M2

## 2020-06-16 DIAGNOSIS — S96.911A RIGHT FOOT STRAIN, INITIAL ENCOUNTER: Primary | ICD-10-CM

## 2020-06-16 PROCEDURE — 99283 EMERGENCY DEPT VISIT LOW MDM: CPT

## 2020-06-16 PROCEDURE — 99284 EMERGENCY DEPT VISIT MOD MDM: CPT | Performed by: EMERGENCY MEDICINE

## 2020-06-16 PROCEDURE — 73630 X-RAY EXAM OF FOOT: CPT

## 2020-06-16 RX ORDER — ONDANSETRON 4 MG/1
4 TABLET, ORALLY DISINTEGRATING ORAL EVERY 8 HOURS PRN
Qty: 30 TABLET | Refills: 0 | Status: SHIPPED | OUTPATIENT
Start: 2020-06-16 | End: 2020-08-21 | Stop reason: ALTCHOICE

## 2020-06-16 RX ORDER — TRAMADOL HYDROCHLORIDE 50 MG/1
50 TABLET ORAL EVERY 6 HOURS PRN
Qty: 6 TABLET | Refills: 0 | Status: SHIPPED | OUTPATIENT
Start: 2020-06-16 | End: 2020-06-23 | Stop reason: ALTCHOICE

## 2020-06-16 RX ORDER — IBUPROFEN 400 MG/1
400 TABLET ORAL ONCE
Status: COMPLETED | OUTPATIENT
Start: 2020-06-16 | End: 2020-06-16

## 2020-06-16 RX ORDER — NAPROXEN 500 MG/1
500 TABLET ORAL 2 TIMES DAILY WITH MEALS
Qty: 10 TABLET | Refills: 0 | Status: SHIPPED | OUTPATIENT
Start: 2020-06-16 | End: 2020-06-23 | Stop reason: SDUPTHER

## 2020-06-16 RX ORDER — ONDANSETRON 4 MG/1
4 TABLET, ORALLY DISINTEGRATING ORAL ONCE
Status: COMPLETED | OUTPATIENT
Start: 2020-06-16 | End: 2020-06-16

## 2020-06-16 RX ORDER — TRAMADOL HYDROCHLORIDE 50 MG/1
50 TABLET ORAL ONCE
Status: COMPLETED | OUTPATIENT
Start: 2020-06-16 | End: 2020-06-16

## 2020-06-16 RX ADMIN — ONDANSETRON 4 MG: 4 TABLET, ORALLY DISINTEGRATING ORAL at 21:08

## 2020-06-16 RX ADMIN — IBUPROFEN 400 MG: 400 TABLET, FILM COATED ORAL at 21:07

## 2020-06-16 RX ADMIN — TRAMADOL HYDROCHLORIDE 50 MG: 50 TABLET, FILM COATED ORAL at 21:08

## 2020-06-23 ENCOUNTER — OFFICE VISIT (OUTPATIENT)
Dept: FAMILY MEDICINE CLINIC | Facility: CLINIC | Age: 35
End: 2020-06-23
Payer: COMMERCIAL

## 2020-06-23 VITALS
WEIGHT: 177 LBS | OXYGEN SATURATION: 98 % | BODY MASS INDEX: 32.57 KG/M2 | HEART RATE: 90 BPM | SYSTOLIC BLOOD PRESSURE: 120 MMHG | DIASTOLIC BLOOD PRESSURE: 84 MMHG | RESPIRATION RATE: 18 BRPM | TEMPERATURE: 98.1 F | HEIGHT: 62 IN

## 2020-06-23 DIAGNOSIS — E66.9 OBESITY (BMI 30-39.9): ICD-10-CM

## 2020-06-23 DIAGNOSIS — S96.911D RIGHT FOOT STRAIN, SUBSEQUENT ENCOUNTER: Primary | ICD-10-CM

## 2020-06-23 DIAGNOSIS — F40.01 PANIC DISORDER WITH AGORAPHOBIA: ICD-10-CM

## 2020-06-23 DIAGNOSIS — F32.9 REACTIVE DEPRESSION: ICD-10-CM

## 2020-06-23 PROCEDURE — T1015 CLINIC SERVICE: HCPCS | Performed by: FAMILY MEDICINE

## 2020-06-23 PROCEDURE — 99214 OFFICE O/P EST MOD 30 MIN: CPT | Performed by: FAMILY MEDICINE

## 2020-06-23 RX ORDER — NAPROXEN 500 MG/1
500 TABLET ORAL 2 TIMES DAILY WITH MEALS
Qty: 28 TABLET | Refills: 0 | Status: SHIPPED | OUTPATIENT
Start: 2020-06-23 | End: 2020-08-26

## 2020-06-23 RX ORDER — VENLAFAXINE HYDROCHLORIDE 37.5 MG/1
37.5 CAPSULE, EXTENDED RELEASE ORAL DAILY
Qty: 30 CAPSULE | Refills: 0 | Status: SHIPPED | OUTPATIENT
Start: 2020-06-23 | End: 2020-08-21

## 2020-07-09 ENCOUNTER — TELEPHONE (OUTPATIENT)
Dept: FAMILY MEDICINE CLINIC | Facility: CLINIC | Age: 35
End: 2020-07-09

## 2020-07-09 NOTE — TELEPHONE ENCOUNTER
Called patient but she has calling restrictions on her phone so it will not go through  Patient was referred to  but her schedule in [place] is currently full  Patient can be placed on a waiting list or we can refer else where for her

## 2020-08-17 ENCOUNTER — TELEMEDICINE (OUTPATIENT)
Dept: FAMILY MEDICINE CLINIC | Facility: CLINIC | Age: 35
End: 2020-08-17
Payer: COMMERCIAL

## 2020-08-17 DIAGNOSIS — J06.9 UPPER RESPIRATORY TRACT INFECTION, UNSPECIFIED TYPE: Primary | ICD-10-CM

## 2020-08-17 PROCEDURE — G0071 COMM SVCS BY RHC/FQHC 5 MIN: HCPCS | Performed by: FAMILY MEDICINE

## 2020-08-17 RX ORDER — AZITHROMYCIN 250 MG/1
TABLET, FILM COATED ORAL
Qty: 6 TABLET | Refills: 0 | Status: SHIPPED | OUTPATIENT
Start: 2020-08-17 | End: 2020-08-21

## 2020-08-17 RX ORDER — BENZONATATE 100 MG/1
100 CAPSULE ORAL 3 TIMES DAILY PRN
Qty: 20 CAPSULE | Refills: 0 | Status: SHIPPED | OUTPATIENT
Start: 2020-08-17 | End: 2020-08-24

## 2020-08-17 NOTE — LETTER
August 17, 2020     Patient: Armond Bowman   YOB: 1985   Date of Visit: 8/17/2020       To Whom it May Concern:    Melody Saldivar is under my professional care  She was seen in my office on 8/17/2020  She may not return to work until she is re-evaluated on Wednesday 8/19/2020    If you have any questions or concerns, please don't hesitate to call           Sincerely,          Jennifer Rosario PA-C        CC: No Recipients

## 2020-08-17 NOTE — PROGRESS NOTES
Virtual Brief Visit    Assessment/Plan:    Problem List Items Addressed This Visit     None      Visit Diagnoses     Upper respiratory tract infection, unspecified type    -  Primary    Relevant Medications    azithromycin (ZITHROMAX) 250 mg tablet    benzonatate (TESSALON PERLES) 100 mg capsule        -given limitations of telemedicine will treat for bacterial URI and possible bronchial pneumonia  Continue tylenol per package instruction for temperatures > 100 4 She should stay home from work until she is fever free for 24 hours without medicine  Tessalon PRN for cough  Reviewed urgent symptoms requiring follow up  -virtual follow up in 2 days or sooner if needed          Reason for visit is   Chief Complaint   Patient presents with    Fever     states intermittent fever for approximately 6 days  Highest temp Friday morning 102  Fever this morning 101 4    Sore Throat     symptoms began last wednesday   Cough     dry cough  Sister was dx with pneumonia 4 days ago after negative covid screen,    Nausea    Vomiting    Diarrhea    Rash     rash on stomach, small red dots  Started Sunday morning, no itching  Dry patches   Virtual Brief Visit        Encounter provider Verona Hernandez PA-C    Provider located at [address] [id number]    Recent Visits  No visits were found meeting these conditions  Showing recent visits within past 7 days and meeting all other requirements     Today's Visits  Date Type Provider Dept   08/17/20 Telemedicine Jennifer Rosario PA-C [address] today's visits and meeting all other requirements     Future Appointments  No visits were found meeting these conditions  Showing future appointments within next 150 days and meeting all other requirements        After connecting through telephone, the patient was identified by name and date of birth   [facility]by was informed that this is a telemedicine visit and that the visit is being conducted through telephone  My office door was closed  No one else was in the room  She acknowledged consent and understanding of privacy and security of the platform  The patient has agreed to participate and understands she can discontinue the visit at any time  Patient is aware this is a billable service  Subjective    Sandra Au is a 37 y o  female who presents via telemedicine for evaluation of URI type symptoms  She notes since last Wednesday ahs has been having congestion, coughing, sore throat, fever T max 102F, and sore throat  She notes her sister recently tested negative for COVID 19 but did have pneumonia  She had diarrhea and nausea vomitng last Friday but it has since resolved  She has been taking Dayquil and Tylenol without relief  Of sypmptoms  She not eating much can still tolerate liquids       HPI     Past Medical History:   Diagnosis Date    Cancer [facility])     sister with lukemia maternal grandmother passed with skin cancer     Depression     after miscarriage    Fetal renal anomaly     Gestational diabetes     pregnancy with daughter    Migraine     Right foot strain, initial encounter 2020    Suspected problem with fetal growth not found     last assessed 14       Past Surgical History:   Procedure Laterality Date     SECTION  10/10/2014    due to daughter being breech    FOOT SURGERY Left     [place]  DELIVERY ONLY N/A 2018    Procedure:  SECTION () REPEAT;  Surgeon: Meli Mcintosh MD;  Location: [facility];  Service: Obstetrics    TUBAL LIGATION N/A 2018    Procedure: LIGATION/COAGULATION TUBAL;  Surgeon: Meli Mcintosh MD;  Location: [facility];  Service: Obstetrics       Current Outpatient Medications   Medication Sig Dispense Refill    azithromycin (ZITHROMAX) 250 mg tablet Take 2 tablets today then 1 tablet daily x 4 days 6 tablet 0    benzonatate (TESSALON PERLES) 100 mg capsule Take 1 capsule (100 mg total) by mouth 3 (three) times a day as needed for cough for up to 7 days 20 capsule 0    naproxen (NAPROSYN) 500 mg tablet Take 1 tablet (500 mg total) by mouth 2 (two) times a day with meals for 14 days 28 tablet 0    ondansetron (ZOFRAN-ODT) 4 mg disintegrating tablet Take 1 tablet (4 mg total) by mouth every 8 (eight) hours as needed for nausea or vomiting 30 tablet 0    venlafaxine (EFFEXOR-XR) 37 5 mg 24 hr capsule Take 1 capsule (37 5 mg total) by mouth daily 30 capsule 0     No current facility-administered medications for this visit  Allergies   Allergen Reactions    Amoxicillin Anaphylaxis    Penicillins Anaphylaxis    Codeine Nausea Only     itching       Review of Systems   Constitutional: Positive for appetite change, fatigue and fever  Negative for activity change, chills and diaphoresis  HENT: Positive for congestion, rhinorrhea and sore throat  Negative for dental problem, drooling, ear discharge, ear pain, postnasal drip, sinus pressure, sinus pain and sneezing  Respiratory: Positive for cough and chest tightness  Negative for shortness of breath and wheezing  Chest tightness due to cough   Cardiovascular: Negative for chest pain and palpitations  Gastrointestinal: Negative for abdominal pain, diarrhea, nausea and vomiting  Musculoskeletal: Negative for arthralgias, joint swelling and myalgias  Skin: Positive for rash  Neurological: Negative for headaches  There were no vitals filed for this visit  I spent 15 minutes with patient today in which greater than 50% of the time was spent in counseling/coordination of care regarding assessment and plan of care    VIRTUAL VISIT DISCLAIMER    Enrrique Ryne Bowman acknowledges that she has consented to an online visit or consultation   She understands that the online visit is based solely on information provided by her, and that, in the absence of a face-to-face physical evaluation by the physician, the diagnosis she receives is both limited and provisional in terms of accuracy and completeness  This is not intended to replace a full medical face-to-face evaluation by the physician  Cuate Bowman understands and accepts these terms  It was my intent to perform this visit via video technology but the patient was not able to do a video connection so the visit was completed via audio telephone only

## 2020-08-21 ENCOUNTER — TELEMEDICINE (OUTPATIENT)
Dept: FAMILY MEDICINE CLINIC | Facility: CLINIC | Age: 35
End: 2020-08-21
Payer: COMMERCIAL

## 2020-08-21 DIAGNOSIS — J06.9 UPPER RESPIRATORY TRACT INFECTION, UNSPECIFIED TYPE: Primary | ICD-10-CM

## 2020-08-21 PROCEDURE — 99213 OFFICE O/P EST LOW 20 MIN: CPT | Performed by: FAMILY MEDICINE

## 2020-08-21 RX ORDER — ALBUTEROL SULFATE 90 UG/1
2 AEROSOL, METERED RESPIRATORY (INHALATION) EVERY 6 HOURS PRN
Qty: 8.5 G | Refills: 0 | Status: SHIPPED | OUTPATIENT
Start: 2020-08-21 | End: 2020-09-04

## 2020-08-21 NOTE — PROGRESS NOTES
Virtual Regular Visit      Assessment/Plan:    Problem List Items Addressed This Visit     None      Visit Diagnoses     Upper respiratory tract infection, unspecified type    -  Primary        Continue z-pack and tylenol per package instruction for temperatures > 100 4 She should stay home from work until she is fever free for 24 hours without medicine  Tessalon PRN for cough  Will also give inhaler for chest tightness  Reviewed urgent symptoms requiring follow up  -I will hold off on COVID testing as patient as known sick contact who was COVID negative   -virtual follow up in 2 days or sooner if needed       Reason for visit is   Chief Complaint   Patient presents with    Fever     patient states she took her temp today and it was 101 3    Cough     patient states she had a cough but it is pretty much gone        Encounter provider Tamera Tabares PA-C    Provider located at [address] [place] [id number]      Recent Visits  Date Type Provider Dept   08/17/20 Telemedicine Jennifer Lott PA-C [address] recent visits within past 7 days and meeting all other requirements     Today's Visits  Date Type Provider Dept   08/21/20 Telemedicine [facility]   Showing today's visits and meeting all other requirements     Future Appointments  No visits were found meeting these conditions  Showing future appointments within next 150 days and meeting all other requirements        The patient was identified by name and date of birth  Squire Romberg Broad was informed that this is a telemedicine visit and that the visit is being conducted through [employer] and patient was informed that this is not a secure, HIPAA-complaint platform  She agrees to proceed     My office door was closed  No one else was in the room  She acknowledged consent and understanding of privacy and security of the video platform   The patient has agreed to participate and understands they can discontinue the visit at any time  Patient is aware this is a billable service  Subjective  Nicole Tan is a 37 y o  female who presents for follow up of URI  She states overall she is feeling better but is still having a dry cough and intermettent fevers  This morning temp was 101F  She has not been needing tylenol as frequently  She has 2 pills of antibiotic left  She has not noticed much of an improvement I her cough with the medications  She has mild SOB/chest tightness  No COVID contacts  Eating and drinking okay        HPI     Past Medical History:   Diagnosis Date    Cancer [facility])     sister with lukemia maternal grandmother passed with skin cancer     Depression     after miscarriage    Fetal renal anomaly     Gestational diabetes     pregnancy with daughter    Migraine     Right foot strain, initial encounter 2020    Suspected problem with fetal growth not found     last assessed 14       Past Surgical History:   Procedure Laterality Date     SECTION  10/10/2014    due to daughter being breech    FOOT SURGERY Left     [place]  DELIVERY ONLY N/A 2018    Procedure:  SECTION () REPEAT;  Surgeon: Emma Bustillos MD;  [facility];  Service: Obstetrics    TUBAL LIGATION N/A 2018    Procedure: LIGATION/COAGULATION TUBAL;  Surgeon: Emma Bustillos MD;  [facility]: [facility];  Service: Obstetrics       Current Outpatient Medications   Medication Sig Dispense Refill    azithromycin (ZITHROMAX) 250 mg tablet Take 2 tablets today then 1 tablet daily x 4 days 6 tablet 0    benzonatate (TESSALON PERLES) 100 mg capsule Take 1 capsule (100 mg total) by mouth 3 (three) times a day as needed for cough for up to 7 days 20 capsule 0    naproxen (NAPROSYN) 500 mg tablet Take 1 tablet (500 mg total) by mouth 2 (two) times a day with meals for 14 days 28 tablet 0     No current facility-administered medications for this visit  Allergies   Allergen Reactions    Amoxicillin Anaphylaxis    Penicillins Anaphylaxis    Codeine Nausea Only     itching       Review of Systems   Constitutional: Positive for fatigue and fever  Negative for activity change, appetite change, chills and diaphoresis  HENT: Negative for congestion, ear pain, postnasal drip, rhinorrhea, sinus pressure, sinus pain and sneezing  Respiratory: Positive for cough and shortness of breath  Cardiovascular: Negative for chest pain  Gastrointestinal: Negative  Endocrine: Negative  Genitourinary: Negative  Musculoskeletal: Negative  Skin: Negative  Allergic/Immunologic: Negative  Neurological: Negative  Hematological: Negative  Psychiatric/Behavioral: Negative  Video Exam    There were no vitals filed for this visit  Physical Exam     I spent 15 minutes with patient today in which greater than 50% of the time was spent in counseling/coordination of care regarding assessment and plan of care      VIRTUAL VISIT DISCLAIMER    Meryle Beans Broad acknowledges that she has consented to an online visit or consultation  She understands that the online visit is based solely on information provided by her, and that, in the absence of a face-to-face physical evaluation by the physician, the diagnosis she receives is both limited and provisional in terms of accuracy and completeness  This is not intended to replace a full medical face-to-face evaluation by the physician  Meryle Beans Broad understands and accepts these terms

## 2020-08-21 NOTE — LETTER
August 21, 2020     Patient: Lg Bowman   YOB: 1985   Date of Visit: 8/21/2020       To Whom it May Concern:    Kedar Yusuf is under my professional care  She was seen in my office on 8/21/2020  She should be excused from work until re-evaluated by this office on Monday 8/24/2020  If you have any questions or concerns, please don't hesitate to call           Sincerely,          Jennifer Rosario PA-C        CC: No Recipients

## 2020-08-24 ENCOUNTER — TELEPHONE (OUTPATIENT)
Dept: FAMILY MEDICINE CLINIC | Facility: CLINIC | Age: 35
End: 2020-08-24

## 2020-08-24 NOTE — TELEPHONE ENCOUNTER
Called patient 3 times for her virtual appointment but there was no answer and her voicemail is full

## 2020-08-26 ENCOUNTER — HOSPITAL ENCOUNTER (OUTPATIENT)
Dept: RADIOLOGY | Facility: HOSPITAL | Age: 35
Discharge: HOME | End: 2020-08-26
Payer: COMMERCIAL

## 2020-08-26 ENCOUNTER — OFFICE VISIT (OUTPATIENT)
Dept: FAMILY MEDICINE CLINIC | Facility: CLINIC | Age: 35
End: 2020-08-26
Payer: COMMERCIAL

## 2020-08-26 VITALS
WEIGHT: 176 LBS | BODY MASS INDEX: 32.39 KG/M2 | OXYGEN SATURATION: 97 % | DIASTOLIC BLOOD PRESSURE: 78 MMHG | TEMPERATURE: 97.8 F | HEIGHT: 62 IN | SYSTOLIC BLOOD PRESSURE: 110 MMHG | HEART RATE: 96 BPM | RESPIRATION RATE: 32 BRPM

## 2020-08-26 DIAGNOSIS — J06.9 URI WITH COUGH AND CONGESTION: ICD-10-CM

## 2020-08-26 DIAGNOSIS — J06.9 URI WITH COUGH AND CONGESTION: Primary | ICD-10-CM

## 2020-08-26 PROCEDURE — 99213 OFFICE O/P EST LOW 20 MIN: CPT | Performed by: FAMILY MEDICINE

## 2020-08-26 PROCEDURE — 71046 X-RAY EXAM CHEST 2 VIEWS: CPT

## 2020-08-26 PROCEDURE — T1015 CLINIC SERVICE: HCPCS | Performed by: FAMILY MEDICINE

## 2020-08-26 RX ORDER — DOXYCYCLINE HYCLATE 100 MG/1
100 CAPSULE ORAL EVERY 12 HOURS SCHEDULED
Qty: 14 CAPSULE | Refills: 0 | Status: SHIPPED | OUTPATIENT
Start: 2020-08-26 | End: 2020-09-02

## 2020-08-26 NOTE — PROGRESS NOTES
Assessment/Plan:     Diagnoses and all orders for this visit:    URI with cough and congestion  -     XR chest pa & lateral; Future  -     doxycycline hyclate (VIBRAMYCIN) 100 mg capsule; Take 1 capsule (100 mg total) by mouth every 12 (twelve) hours for 7 days  -     dextromethorphan-guaifenesin (MUCINEX DM)  MG per 12 hr tablet; Take 1 tablet by mouth every 12 (twelve) hours        - Check CXR  Will treat with doxycycline and mucinex  Continue tylenol and albuterol prn  Return if symptoms worsen or fail to improve  Subjective:        Patient ID: Tushar Wagoner is a 37 y o  female  Chief Complaint   Patient presents with    URI     pt has had an upper respiratory infection, tessalon perles did not help and inhaler has also made it worse        Genia Phalen is a 37year old female presenting with persistent URI symptoms  Patient was evaluated via virtual visit  for congestion, coughing, sore throat, fever, and sore throat and treated with a Zpak and tessalon pearls  She was seen again  with a persistent cough and chest tightness and given an albuterol inhaler  Today patient presents with complaint of ongoing cough, now productive of gray/green mucus  Reports that previous treatments have not helped her symptoms  She continues to have nasal congestion, sinus pressure, and sore throat  She has been taking tylenol prn and using the albuterol inhaler daily  States that she feels the inhaler makes the cough worse  Reports she has not had any fevers since finishing the Zpak  Denies N/V/D  No known COVID contacts  The following portions of the patient's history were reviewed and updated as appropriate: allergies, current medications, past family history, past medical history, past social history, past surgical history and problem list     Patient Active Problem List   Diagnosis    History of  section, low transverse    Unicornuate uterus    Obesity (BMI 30-39  9)    Rubella non-immune status, antepartum    Fetal renal anomaly    Obesity complicating pregnancy    Delivery of pregnancy by  section    Right foot strain, subsequent encounter       Current Outpatient Medications   Medication Sig Dispense Refill    albuterol (ProAir HFA) 90 mcg/act inhaler Inhale 2 puffs every 6 (six) hours as needed for wheezing or shortness of breath for up to 14 days 8 5 g 0    naproxen (NAPROSYN) 500 mg tablet Take 1 tablet (500 mg total) by mouth 2 (two) times a day with meals for 14 days 28 tablet 0    dextromethorphan-guaifenesin (MUCINEX DM)  MG per 12 hr tablet Take 1 tablet by mouth every 12 (twelve) hours 14 tablet 0    doxycycline hyclate (VIBRAMYCIN) 100 mg capsule Take 1 capsule (100 mg total) by mouth every 12 (twelve) hours for 7 days 14 capsule 0     No current facility-administered medications for this visit           Past Medical History:   Diagnosis Date    Cancer [facility])     sister with lukemia maternal grandmother passed with skin cancer     Depression     after miscarriage    Fetal renal anomaly     Gestational diabetes     pregnancy with daughter    Migraine     Right foot strain, initial encounter 2020    Suspected problem with fetal growth not found     last assessed 14        Past Surgical History:   Procedure Laterality Date     SECTION  10/10/2014    due to daughter being breech    FOOT SURGERY Left     OR  DELIVERY ONLY N/A 2018    Procedure:  SECTION () REPEAT;  Surgeon: Charisse Agudelo MD;  Location: [facility];  Service: Obstetrics    TUBAL LIGATION N/A 2018    Procedure: LIGATION/COAGULATION TUBAL;  Surgeon: Charisse Agudelo MD;  Location: [facility];  Service: Obstetrics        Social History     Socioeconomic History    Marital status: Single     Spouse name: Not on file    Number of children: Not on file    Years of education: Not on file    Highest education level: Not on file   Occupational History    Not on file   Social Needs    Financial resource strain: Not on file    Food insecurity     Worry: Not on file     Inability: Not on file    Transportation needs     Medical: Not on file     Non-medical: Not on file   Tobacco Use    Smoking status: Current Every Day Smoker     Packs/day: 0 50     Types: Cigarettes    Smokeless tobacco: Never Used    Tobacco comment: 4/5 cigarettes a day   Substance and Sexual Activity    Alcohol use: No    Drug use: No     Comment: using marijuana as per [employer]    Sexual activity: Yes     Partners: Male   Lifestyle    Physical activity     Days per week: Not on file     Minutes per session: Not on file    Stress: Not on file   Relationships    Social connections     Talks on phone: Not on file     Gets together: Not on file     Attends Rastafarian service: Not on file     Active member of club or organization: Not on file     Attends meetings of clubs or organizations: Not on file     Relationship status: Not on file    Intimate partner violence     Fear of current or ex partner: Not on file     Emotionally abused: Not on file     Physically abused: Not on file     Forced sexual activity: Not on file   Other Topics Concern    Not on file   Social History Narrative    Not on file        Review of Systems   Constitutional: Negative for chills, diaphoresis and fever  HENT: Positive for congestion, rhinorrhea, sinus pressure and sore throat  Negative for ear discharge and ear pain  Eyes: Negative for photophobia, pain, discharge, redness, itching and visual disturbance  Respiratory: Positive for cough, chest tightness, shortness of breath and wheezing  Cardiovascular: Negative for chest pain, palpitations and leg swelling  Gastrointestinal: Negative for abdominal pain, constipation, diarrhea, nausea and vomiting  Skin: Negative for rash and wound  Neurological: Positive for headaches   Negative for dizziness, syncope, weakness and light-headedness  Objective:      /78   Pulse 96   Temp 97 8 °F (36 6 °C) (Tympanic)   Resp (!) 32   Ht 5' 2" (1 575 m)   Wt 79 8 kg (176 lb)   SpO2 97%   BMI 32 19 kg/m²          Physical Exam  Vitals signs and nursing note reviewed  Constitutional:       General: She is not in acute distress  HENT:      Head: Normocephalic and atraumatic  Right Ear: Tympanic membrane, ear canal and external ear normal       Left Ear: Tympanic membrane, ear canal and external ear normal       Nose:      Right Sinus: Maxillary sinus tenderness present  Left Sinus: Maxillary sinus tenderness present  Mouth/Throat:      Mouth: Mucous membranes are moist       Pharynx: Oropharynx is clear  Posterior oropharyngeal erythema present  No oropharyngeal exudate  Eyes:      Extraocular Movements: Extraocular movements intact  Conjunctiva/sclera: Conjunctivae normal       Pupils: Pupils are equal, round, and reactive to light  Neck:      Musculoskeletal: Normal range of motion and neck supple  Cardiovascular:      Rate and Rhythm: Normal rate and regular rhythm  Heart sounds: Normal heart sounds  No murmur  Pulmonary:      Effort: Pulmonary effort is normal  No respiratory distress  Breath sounds: Normal breath sounds  No wheezing  Musculoskeletal: Normal range of motion  General: No swelling  Skin:     General: Skin is warm and dry  Neurological:      General: No focal deficit present  Mental Status: She is alert and oriented to person, place, and time  Cranial Nerves: No cranial nerve deficit     Psychiatric:         Mood and Affect: Mood normal          Behavior: Behavior normal

## 2020-08-27 ENCOUNTER — TELEPHONE (OUTPATIENT)
Dept: FAMILY MEDICINE CLINIC | Facility: CLINIC | Age: 35
End: 2020-08-27

## 2020-08-27 NOTE — TELEPHONE ENCOUNTER
----- Message from Evita Yang PA-C sent at 8/27/2020  8:25 AM EDT -----  Please make patient aware her CXR was normal

## 2020-08-28 NOTE — TELEPHONE ENCOUNTER
Patient returned call, informed her of CXR results,patient verbalized understanding         Patient would like to have a return to work note dated for Monday 8/31/2020  Please advise  Patient to call back with fax number

## 2021-01-08 ENCOUNTER — TELEPHONE (OUTPATIENT)
Dept: OTHER | Facility: OTHER | Age: 36
End: 2021-01-08

## 2021-01-08 DIAGNOSIS — Z20.822 COVID-19 RULED OUT: Primary | ICD-10-CM

## 2021-01-08 NOTE — PROGRESS NOTES
Spoke to patient on the phone  She is currently living with her mother who was tested positive on COVID on 1/6/21  Patient reports mild nonproductive cough and congestion for one day  Denied fever, SOB, HA  Ordered COVID testing and referred patient to [facility] [place] to get tested  Recommended patient and household members to stay home for 14 days and supportive care at this time  Patient verbalized understanding      Annie Stovall MD  1/8/21  3:55pm

## 2021-01-08 NOTE — TELEPHONE ENCOUNTER
TigerText:    [phone]/ PT Himanshu Acevedo  [phone]/ PT was exposed to her mother who tested positive for COVID  She is experiencing a milk cough, and congestion and is looking to get tested

## 2021-01-13 DIAGNOSIS — Z20.822 COVID-19 RULED OUT: ICD-10-CM

## 2021-01-13 PROCEDURE — U0003 INFECTIOUS AGENT DETECTION BY NUCLEIC ACID (DNA OR RNA); SEVERE ACUTE RESPIRATORY SYNDROME CORONAVIRUS 2 (SARS-COV-2) (CORONAVIRUS DISEASE [COVID-19]), AMPLIFIED PROBE TECHNIQUE, MAKING USE OF HIGH THROUGHPUT TECHNOLOGIES AS DESCRIBED BY CMS-2020-01-R: HCPCS | Performed by: STUDENT IN AN ORGANIZED HEALTH CARE EDUCATION/TRAINING PROGRAM

## 2021-01-13 PROCEDURE — U0005 INFEC AGEN DETEC AMPLI PROBE: HCPCS | Performed by: STUDENT IN AN ORGANIZED HEALTH CARE EDUCATION/TRAINING PROGRAM

## 2021-01-14 LAB — SARS-COV-2 RNA SPEC QL NAA+PROBE: DETECTED

## 2021-01-16 ENCOUNTER — TELEPHONE (OUTPATIENT)
Dept: FAMILY MEDICINE CLINIC | Facility: CLINIC | Age: 36
End: 2021-01-16

## 2021-01-16 DIAGNOSIS — U07.1 COVID-19: Primary | ICD-10-CM

## 2021-01-16 RX ORDER — MULTIVIT WITH MINERALS/LUTEIN
1000 TABLET ORAL 2 TIMES DAILY
Qty: 20 TABLET | Refills: 0 | Status: SHIPPED | OUTPATIENT
Start: 2021-01-16

## 2021-01-16 RX ORDER — MELATONIN
2000 DAILY
Qty: 40 TABLET | Refills: 0 | Status: SHIPPED | OUTPATIENT
Start: 2021-01-16 | End: 2021-02-05

## 2021-01-16 NOTE — TELEPHONE ENCOUNTER
Spoke to patient on the phone follow up on positive COVID result (tested positive on 1/13/21)  Patient reports symptoms of nonproductive cough, sore throat, intermittent fever with Tmax 102F 3-4 days ago  Denied SOB, CP, N/V, diarrhea  Symptoms progressively improving  Prescribed Vitamin D3 2000 IU PO daily and Vitamin C 1g PO Q12H  Encourage patient to get OTC multivitamin daily as well  Advised to call us if symptoms worsening       Jese Thomas MD  1/16/21  11:07am

## 2021-10-27 ENCOUNTER — TELEMEDICINE (OUTPATIENT)
Dept: FAMILY MEDICINE CLINIC | Facility: CLINIC | Age: 36
End: 2021-10-27
Payer: COMMERCIAL

## 2021-10-27 DIAGNOSIS — J06.9 URI WITH COUGH AND CONGESTION: ICD-10-CM

## 2021-10-27 PROCEDURE — G0071 COMM SVCS BY RHC/FQHC 5 MIN: HCPCS | Performed by: FAMILY MEDICINE

## 2021-10-27 RX ORDER — IBUPROFEN 400 MG/1
400 TABLET ORAL EVERY 6 HOURS PRN
Qty: 30 TABLET | Refills: 0 | Status: SHIPPED | OUTPATIENT
Start: 2021-10-27

## 2021-10-27 RX ORDER — DIPHENOXYLATE HYDROCHLORIDE AND ATROPINE SULFATE 2.5; .025 MG/1; MG/1
1 TABLET ORAL DAILY
COMMUNITY

## 2021-10-29 ENCOUNTER — TELEMEDICINE (OUTPATIENT)
Dept: FAMILY MEDICINE CLINIC | Facility: CLINIC | Age: 36
End: 2021-10-29
Payer: COMMERCIAL

## 2021-10-29 DIAGNOSIS — J06.9 URI WITH COUGH AND CONGESTION: Primary | ICD-10-CM

## 2021-10-29 PROCEDURE — G0071 COMM SVCS BY RHC/FQHC 5 MIN: HCPCS | Performed by: FAMILY MEDICINE

## 2021-12-02 ENCOUNTER — OFFICE VISIT (OUTPATIENT)
Dept: FAMILY MEDICINE CLINIC | Facility: CLINIC | Age: 36
End: 2021-12-02
Payer: COMMERCIAL

## 2021-12-02 VITALS
WEIGHT: 171 LBS | SYSTOLIC BLOOD PRESSURE: 112 MMHG | BODY MASS INDEX: 31.47 KG/M2 | HEART RATE: 91 BPM | TEMPERATURE: 99 F | DIASTOLIC BLOOD PRESSURE: 68 MMHG | OXYGEN SATURATION: 98 % | RESPIRATION RATE: 18 BRPM | HEIGHT: 62 IN

## 2021-12-02 DIAGNOSIS — L72.0 INCLUSION CYST OF RIGHT BREAST: Primary | ICD-10-CM

## 2021-12-02 PROCEDURE — 99213 OFFICE O/P EST LOW 20 MIN: CPT | Performed by: FAMILY MEDICINE

## 2021-12-02 PROCEDURE — T1015 CLINIC SERVICE: HCPCS | Performed by: FAMILY MEDICINE

## 2022-07-16 ENCOUNTER — OFFICE VISIT (OUTPATIENT)
Dept: URGENT CARE | Facility: MEDICAL CENTER | Age: 37
End: 2022-07-16
Payer: COMMERCIAL

## 2022-07-16 VITALS — OXYGEN SATURATION: 98 % | HEART RATE: 78 BPM | RESPIRATION RATE: 18 BRPM | TEMPERATURE: 98 F

## 2022-07-16 DIAGNOSIS — J02.9 ACUTE VIRAL PHARYNGITIS: Primary | ICD-10-CM

## 2022-07-16 LAB
S PYO AG THROAT QL: NEGATIVE
SARS-COV-2 AG UPPER RESP QL IA: NEGATIVE
VALID CONTROL: NORMAL

## 2022-07-16 PROCEDURE — 99213 OFFICE O/P EST LOW 20 MIN: CPT

## 2022-07-16 PROCEDURE — 87811 SARS-COV-2 COVID19 W/OPTIC: CPT

## 2022-07-16 PROCEDURE — 87880 STREP A ASSAY W/OPTIC: CPT

## 2022-07-16 PROCEDURE — 87070 CULTURE OTHR SPECIMN AEROBIC: CPT

## 2022-07-16 NOTE — PATIENT INSTRUCTIONS
Upper Respiratory infection  - Take Vitamin D3 200IU daily, Vitamin C, and zinc  Rest and drink electrolyte rich fluids  Tylenol and ibuprofen as needed for fevers and aches following package dosing instructions  Chicken noodle soup  - Sore throat: Throat lozenges and/or salt water gurgles  - Congestion relief with mucinex 600mg 1 tablet every 12 hours  You can use afrin or flonase for nasal congestion as directed on their packaging  Warm or cool air mist humidifiers    - Nighttime cough relief with Mucinex DM or NyQuil   - Go to the ED if you have trouble breathing or shortness of breath at rest, chest pain or pressure that lasts longer than 5 minutes, become confused or hard to wake, your lips or face are blue, you have a fever of 104°F (40°C) or higher   - Follow up with Family doctor as needed, however your symptoms may persists for 2-3 weeks from onset  - check her [address] [place] [address] for throat culture results and call in if they are positive for further direction

## 2022-07-16 NOTE — PROGRESS NOTES
[facility]        NAME: Natalie Mccloud is a 31 y o  female  : 1985    MRN: [id number]  DATE: 2022  TIME: 2:51 PM    Assessment and Plan   Acute viral pharyngitis [J02 9]  1  Acute viral pharyngitis  POCT rapid strepA    Poct Covid 19 Rapid Antigen Test    Throat culture     Rapid strep and covid negative    Patient Instructions     Upper Respiratory infection  - Take Vitamin D3 200IU daily, Vitamin C, and zinc  Rest and drink electrolyte rich fluids  Tylenol and ibuprofen as needed for fevers and aches following package dosing instructions  Chicken noodle soup  - Sore throat: Throat lozenges and/or salt water gurgles  - Congestion relief with mucinex 600mg 1 tablet every 12 hours  You can use afrin or flonase for nasal congestion as directed on their packaging  Warm or cool air mist humidifiers    - Nighttime cough relief with Mucinex DM or NyQuil   - Go to the ED if you have trouble breathing or shortness of breath at rest, chest pain or pressure that lasts longer than 5 minutes, become confused or hard to wake, your lips or face are blue, you have a fever of 104°F (40°C) or higher   - Follow up with Family doctor as needed, however your symptoms may persists for 2-3 weeks from onset  - check her [address] [place] [address] for throat culture results and call in if they are positive for further direction  Follow up with PCP in 3-5 days  Proceed to  ER if symptoms worsen  Chief Complaint     Chief Complaint   Patient presents with    Fever     Sore throat, earache in left ear, started yesterday         History of Present Illness     Natalie Mccloud is a 31 y o  female who presents with complaint of odynophagia that lateralizes to the left side, earache that lateralizes to the left side, and fevers that all started yesterday  Patient states that she is not vaccinated against COVID-19    She is concerned that she may have a strep throat infection as she is having difficult time swelling, however she can handle her own secretions  She denies drooling, dysphagia, chest pain, shortness a breath, nausea, vomiting, diarrhea  Review of Systems   Review of Systems   Constitutional: Negative for chills, fatigue and fever  HENT: Positive for congestion, rhinorrhea, sinus pressure and sore throat  Negative for drooling, ear pain, trouble swallowing and voice change  Respiratory: Negative for cough, shortness of breath and wheezing  Cardiovascular: Negative for chest pain and palpitations  Gastrointestinal: Negative for abdominal pain, constipation, diarrhea, nausea and vomiting  Musculoskeletal: Negative for myalgias  Neurological: Negative for headaches  All other systems reviewed and are negative          Current Medications       Current Outpatient Medications:     Ascorbic Acid (vitamin C) 1000 MG tablet, Take 1 tablet (1,000 mg total) by mouth 2 (two) times a day (Patient not taking: No sig reported), Disp: 20 tablet, Rfl: 0    cholecalciferol (VITAMIN D3) 1,000 units tablet, Take 2 tablets (2,000 Units total) by mouth daily for 20 days (Patient not taking: Reported on 10/27/2021), Disp: 40 tablet, Rfl: 0    dextromethorphan-guaifenesin (MUCINEX DM)  MG per 12 hr tablet, Take 1 tablet by mouth every 12 (twelve) hours (Patient not taking: No sig reported), Disp: 14 tablet, Rfl: 0    ibuprofen (MOTRIN) 400 mg tablet, Take 1 tablet (400 mg total) by mouth every 6 (six) hours as needed for mild pain, fever or headaches (Patient not taking: No sig reported), Disp: 30 tablet, Rfl: 0    multivitamin (THERAGRAN) TABS, Take 1 tablet by mouth daily (Patient not taking: Reported on 7/16/2022), Disp: , Rfl:     naproxen (NAPROSYN) 500 mg tablet, Take 1 tablet (500 mg total) by mouth 2 (two) times a day with meals for 14 days (Patient not taking: Reported on 10/27/2021), Disp: 28 tablet, Rfl: 0    Current Allergies     Allergies as of 07/16/2022 - Reviewed 2022   Allergen Reaction Noted    Amoxicillin Anaphylaxis 2014    Penicillins Anaphylaxis 2014    Codeine Nausea Only 2016            The following portions of the patient's history were reviewed and updated as appropriate: allergies, current medications, past family history, past medical history, past social history, past surgical history and problem list      Past Medical History:   Diagnosis Date    Cancer [facility])     sister with lukemia maternal grandmother passed with skin cancer     Depression     after miscarriage    Fetal renal anomaly     Gestational diabetes     pregnancy with daughter    Migraine     Right foot strain, initial encounter 2020    Suspected problem with fetal growth not found     last assessed 14       Past Surgical History:   Procedure Laterality Date     SECTION  10/10/2014    due to daughter being breech    FOOT SURGERY Left     [place]  DELIVERY ONLY N/A 2018    Procedure:  SECTION () REPEAT;  Surgeon: Aurelio Cervantes MD;  Location: [facility];  Service: Obstetrics    TUBAL LIGATION N/A 2018    Procedure: LIGATION/COAGULATION TUBAL;  Surgeon: Aurelio Cervantes MD;  Location: [facility];  Service: Obstetrics       Family History   Problem Relation Age of Onset    Heart defect Mother         cardiac disorder    Leukemia Sister     Cancer Family     Diabetes Family     Leukemia Family     Parkinsonism Family     No Known Problems Father     No Known Problems Brother     No Known Problems Daughter     No Known Problems Sister     No Known Problems Sister     No Known Problems Sister     No Known Problems Sister     No Known Problems Sister     No Known Problems Sister     No Known Problems Sister     No Known Problems Sister     Cleft palate Brother     No Known Problems Brother     No Known Problems Brother     No Known Problems Brother          Medications have been verified  Objective   Pulse 78   Temp 98 °F (36 7 °C)   Resp 18   SpO2 98%   No LMP recorded  Physical Exam     Physical Exam  Vitals reviewed  Constitutional:       General: She is not in acute distress  Appearance: Normal appearance  She is obese  She is not ill-appearing  HENT:      Head: Normocephalic and atraumatic  Right Ear: Hearing, tympanic membrane, ear canal and external ear normal  No tenderness  There is no impacted cerumen  Tympanic membrane is not injected, perforated or erythematous  Left Ear: Hearing, tympanic membrane, ear canal and external ear normal  No tenderness  There is no impacted cerumen  Tympanic membrane is not injected, perforated or erythematous  Nose: Congestion present  No rhinorrhea  Mouth/Throat:      Lips: Pink  Mouth: Mucous membranes are moist       Pharynx: Oropharynx is clear  Uvula midline  Posterior oropharyngeal erythema and uvula swelling present  No oropharyngeal exudate  Tonsils: No tonsillar exudate or tonsillar abscesses  Eyes:      General:         Right eye: No discharge  Left eye: No discharge  Extraocular Movements: Extraocular movements intact  Conjunctiva/sclera: Conjunctivae normal       Pupils: Pupils are equal, round, and reactive to light  Cardiovascular:      Rate and Rhythm: Normal rate and regular rhythm  Heart sounds: Normal heart sounds  No murmur heard  No friction rub  No gallop  Pulmonary:      Effort: Pulmonary effort is normal       Breath sounds: Normal breath sounds  No wheezing, rhonchi or rales  Neurological:      Mental Status: She is alert

## 2022-07-16 NOTE — LETTER
July 16, 2022     Patient: Jeri Bowman   YOB: 1985   Date of Visit: 7/16/2022       To Whom it May Concern:    Parth Rob was seen in my clinic on 7/16/2022  She can return when fever free for 24 hours without the use of fever reducing agents  If you have any questions or concerns, please don't hesitate to call           Sincerely,          Wava Kocher, PA-C        CC: No Recipients

## 2022-07-18 LAB — BACTERIA THROAT CULT: NORMAL

## 2022-12-09 ENCOUNTER — VBI (OUTPATIENT)
Dept: ADMINISTRATIVE | Facility: OTHER | Age: 37
End: 2022-12-09

## 2022-12-17 ENCOUNTER — OFFICE VISIT (OUTPATIENT)
Dept: URGENT CARE | Facility: MEDICAL CENTER | Age: 37
End: 2022-12-17

## 2022-12-17 VITALS
RESPIRATION RATE: 20 BRPM | TEMPERATURE: 98 F | DIASTOLIC BLOOD PRESSURE: 100 MMHG | WEIGHT: 184.4 LBS | BODY MASS INDEX: 33.73 KG/M2 | SYSTOLIC BLOOD PRESSURE: 132 MMHG | OXYGEN SATURATION: 97 % | HEART RATE: 69 BPM

## 2022-12-17 DIAGNOSIS — Z71.1 WORRIED WELL: Primary | ICD-10-CM

## 2022-12-17 NOTE — PROGRESS NOTES
[facility]        NAME: Bhanu Fenton is a 30 y o  female  : 1985    MRN: [id number]  DATE: 2022  TIME: 3:50 PM    Assessment and Plan   No primary diagnosis found  No diagnosis found  Patient Instructions       Follow up with PCP in 3-5 days  Proceed to  ER if symptoms worsen  Chief Complaint     Chief Complaint   Patient presents with   • wants a COVID test  asymptomatic         History of Present Illness       Patient is here requesting a COVID test despite being asymptomatic  Her 2 children tested positive for flu a week apart  Her  came down with similar symptoms when he is tested he tested positive for COVID but negative for influenza a and B  She has nephew just came home from  [address] and they are requesting testing prior to seeing the child  Patient was explained she should be tested of a after 5 days of the last exposure to the COVID positive patient  If she becomes symptomatic in the meantime, she can be tested  She also has the option of obtaining home COVID tests and testing at home  Review of Systems   Review of Systems   Constitutional: Negative for fever  Respiratory: Negative for cough            Current Medications       Current Outpatient Medications:   •  Ascorbic Acid (vitamin C) 1000 MG tablet, Take 1 tablet (1,000 mg total) by mouth 2 (two) times a day (Patient not taking: No sig reported), Disp: 20 tablet, Rfl: 0  •  cholecalciferol (VITAMIN D3) 1,000 units tablet, Take 2 tablets (2,000 Units total) by mouth daily for 20 days (Patient not taking: Reported on 10/27/2021), Disp: 40 tablet, Rfl: 0  •  dextromethorphan-guaifenesin (MUCINEX DM)  MG per 12 hr tablet, Take 1 tablet by mouth every 12 (twelve) hours (Patient not taking: No sig reported), Disp: 14 tablet, Rfl: 0  •  ibuprofen (MOTRIN) 400 mg tablet, Take 1 tablet (400 mg total) by mouth every 6 (six) hours as needed for mild pain, fever or headaches (Patient not taking: No sig reported), Disp: 30 tablet, Rfl: 0  •  multivitamin (THERAGRAN) TABS, Take 1 tablet by mouth daily (Patient not taking: Reported on 2022), Disp: , Rfl:   •  naproxen (NAPROSYN) 500 mg tablet, Take 1 tablet (500 mg total) by mouth 2 (two) times a day with meals for 14 days (Patient not taking: Reported on 10/27/2021), Disp: 28 tablet, Rfl: 0    Current Allergies     Allergies as of 2022 - Reviewed 2022   Allergen Reaction Noted   • Amoxicillin Anaphylaxis 2014   • Penicillins Anaphylaxis 2014   • Codeine Nausea Only 2016            The following portions of the patient's history were reviewed and updated as appropriate: allergies, current medications, past family history, past medical history, past social history, past surgical history and problem list      Past Medical History:   Diagnosis Date   • Cancer ([facility] [address] )     sister with lukemia maternal grandmother passed with skin cancer    • Depression     after miscarriage   • Fetal renal anomaly    • Gestational diabetes     pregnancy with daughter   • Migraine    • Right foot strain, initial encounter 2020   • Suspected problem with fetal growth not found     last assessed 14       Past Surgical History:   Procedure Laterality Date   •  SECTION  10/10/2014    due to daughter being breech   • FOOT SURGERY Left    • [place]  DELIVERY ONLY N/A 2018    Procedure:  SECTION () REPEAT;  Surgeon: Tevin Polanco MD;  Location: [facility];  Service: Obstetrics   • TUBAL LIGATION N/A 2018    Procedure: LIGATION/COAGULATION TUBAL;  Surgeon: Tevin Polanco MD;  Location: [facility];  Service: Obstetrics       Family History   Problem Relation Age of Onset   • Heart defect Mother         cardiac disorder   • Leukemia Sister    • Cancer Family    • Diabetes Family    • Leukemia Family    • Parkinsonism Family    • No Known Problems Father    • No Known Problems Brother    • No Known Problems Daughter    • No Known Problems Sister    • No Known Problems Sister    • No Known Problems Sister    • No Known Problems Sister    • No Known Problems Sister    • No Known Problems Sister    • No Known Problems Sister    • No Known Problems Sister    • Cleft palate Brother    • No Known Problems Brother    • No Known Problems Brother    • No Known Problems Brother          Medications have been verified  Objective   /100   Pulse 69   Temp 98 °F (36 7 °C)   Resp 20   Wt 83 6 kg (184 lb 6 4 oz)   SpO2 97%   BMI 33 73 kg/m²   No LMP recorded  (Menstrual status: Oopherectomy)  Physical Exam     Physical Exam  Vitals and nursing note reviewed  Constitutional:       Appearance: Normal appearance  HENT:      Head: Normocephalic and atraumatic  Cardiovascular:      Rate and Rhythm: Normal rate and regular rhythm  Pulmonary:      Effort: Pulmonary effort is normal    Neurological:      Mental Status: She is alert

## 2023-01-18 ENCOUNTER — OFFICE VISIT (OUTPATIENT)
Dept: URGENT CARE | Facility: MEDICAL CENTER | Age: 38
End: 2023-01-18

## 2023-01-18 VITALS
BODY MASS INDEX: 34.93 KG/M2 | WEIGHT: 185 LBS | OXYGEN SATURATION: 98 % | TEMPERATURE: 98.3 F | RESPIRATION RATE: 18 BRPM | HEIGHT: 61 IN | HEART RATE: 93 BPM

## 2023-01-18 DIAGNOSIS — J02.9 SORE THROAT: ICD-10-CM

## 2023-01-18 DIAGNOSIS — J02.9 ACUTE PHARYNGITIS, UNSPECIFIED ETIOLOGY: Primary | ICD-10-CM

## 2023-01-18 LAB — S PYO AG THROAT QL: NEGATIVE

## 2023-01-18 RX ORDER — AZITHROMYCIN 250 MG/1
TABLET, FILM COATED ORAL
Qty: 6 TABLET | Refills: 0 | Status: SHIPPED | OUTPATIENT
Start: 2023-01-18 | End: 2023-01-22

## 2023-01-18 NOTE — PROGRESS NOTES
[facility] Now        NAME: Meeta Arambula is a 30 y o  female  : 1985    MRN: [id number]  DATE: 2023  TIME: 5:33 PM    Assessment and Plan   Acute pharyngitis, unspecified etiology [J02 9]  1  Acute pharyngitis, unspecified etiology  azithromycin (ZITHROMAX) 250 mg tablet      2  Sore throat  POCT rapid strepA            Patient Instructions       Follow up with PCP in 3-5 days  Proceed to  ER if symptoms worsen  Chief Complaint     Chief Complaint   Patient presents with   • Sore Throat     Sore throat this am, has pain in left ear, sinus pressure for 1 day         History of Present Illness        Patient presents with sore throat left ear pain and sinus pressure which started earlier today  Daughter has been battling strep for the past month  No fever chills  Review of Systems   Review of Systems   Constitutional: Negative for fever  HENT: Positive for ear pain and sore throat  Negative for congestion and rhinorrhea  Respiratory: Negative for cough  Gastrointestinal: Negative for diarrhea, nausea and vomiting           Current Medications       Current Outpatient Medications:   •  azithromycin (ZITHROMAX) 250 mg tablet, Take 2 tablets today then 1 tablet daily x 4 days, Disp: 6 tablet, Rfl: 0  •  Ascorbic Acid (vitamin C) 1000 MG tablet, Take 1 tablet (1,000 mg total) by mouth 2 (two) times a day (Patient not taking: Reported on 10/27/2021), Disp: 20 tablet, Rfl: 0  •  cholecalciferol (VITAMIN D3) 1,000 units tablet, Take 2 tablets (2,000 Units total) by mouth daily for 20 days (Patient not taking: Reported on 10/27/2021), Disp: 40 tablet, Rfl: 0  •  dextromethorphan-guaifenesin (MUCINEX DM)  MG per 12 hr tablet, Take 1 tablet by mouth every 12 (twelve) hours (Patient not taking: Reported on 2021), Disp: 14 tablet, Rfl: 0  •  ibuprofen (MOTRIN) 400 mg tablet, Take 1 tablet (400 mg total) by mouth every 6 (six) hours as needed for mild pain, fever or headaches (Patient not taking: Reported on 2021), Disp: 30 tablet, Rfl: 0  •  multivitamin (THERAGRAN) TABS, Take 1 tablet by mouth daily (Patient not taking: Reported on 2022), Disp: , Rfl:   •  naproxen (NAPROSYN) 500 mg tablet, Take 1 tablet (500 mg total) by mouth 2 (two) times a day with meals for 14 days (Patient not taking: Reported on 10/27/2021), Disp: 28 tablet, Rfl: 0    Current Allergies     Allergies as of 2023 - Reviewed 2023   Allergen Reaction Noted   • Amoxicillin Anaphylaxis 2014   • Penicillins Anaphylaxis 2014   • Codeine Nausea Only 2016            The following portions of the patient's history were reviewed and updated as appropriate: allergies, current medications, past family history, past medical history, past social history, past surgical history and problem list      Past Medical History:   Diagnosis Date   • Cancer ([facility] [address] )     sister with lukemia maternal grandmother passed with skin cancer    • Depression     after miscarriage   • Fetal renal anomaly    • Gestational diabetes     pregnancy with daughter   • Migraine    • Right foot strain, initial encounter 2020   • Suspected problem with fetal growth not found     last assessed 14       Past Surgical History:   Procedure Laterality Date   •  SECTION  10/10/2014    due to daughter being breech   • FOOT SURGERY Left    • [place]  DELIVERY ONLY N/A 2018    Procedure:  SECTION () REPEAT;  Surgeon: Radha Rondon MD;  Location: [facility];  Service: Obstetrics   • TUBAL LIGATION N/A 2018    Procedure: LIGATION/COAGULATION TUBAL;  Surgeon: Radha Rondon MD;  Location: [facility];  Service: Obstetrics       Family History   Problem Relation Age of Onset   • Heart defect Mother         cardiac disorder   • Leukemia Sister    • Cancer Family    • Diabetes Family    • Leukemia Family    • Parkinsonism Family    • No Known Problems Father • No Known Problems Brother    • No Known Problems Daughter    • No Known Problems Sister    • No Known Problems Sister    • No Known Problems Sister    • No Known Problems Sister    • No Known Problems Sister    • No Known Problems Sister    • No Known Problems Sister    • No Known Problems Sister    • Cleft palate Brother    • No Known Problems Brother    • No Known Problems Brother    • No Known Problems Brother          Medications have been verified  Objective   Pulse 93   Temp 98 3 °F (36 8 °C) (Temporal)   Resp 18   Ht 5' 1" (1 549 m)   Wt 83 9 kg (185 lb)   SpO2 98%   BMI 34 96 kg/m²   No LMP recorded  (Menstrual status: Oopherectomy)  Physical Exam     Physical Exam  Vitals and nursing note reviewed  Constitutional:       Appearance: She is well-developed  HENT:      Head: Normocephalic and atraumatic  Right Ear: Tympanic membrane normal       Left Ear: Tympanic membrane normal       Mouth/Throat:      Mouth: Mucous membranes are moist       Pharynx: Posterior oropharyngeal erythema present  No oropharyngeal exudate  Eyes:      Conjunctiva/sclera: Conjunctivae normal    Cardiovascular:      Rate and Rhythm: Normal rate and regular rhythm  Heart sounds: Normal heart sounds  Pulmonary:      Effort: Pulmonary effort is normal       Breath sounds: Normal breath sounds  Skin:     General: Skin is warm  Neurological:      Mental Status: She is alert

## 2023-05-12 ENCOUNTER — TELEPHONE (OUTPATIENT)
Dept: FAMILY MEDICINE CLINIC | Facility: CLINIC | Age: 38
End: 2023-05-12

## 2023-07-18 ENCOUNTER — OFFICE VISIT (OUTPATIENT)
Dept: FAMILY MEDICINE CLINIC | Facility: CLINIC | Age: 38
End: 2023-07-18
Payer: COMMERCIAL

## 2023-07-18 VITALS
HEIGHT: 61 IN | HEART RATE: 106 BPM | TEMPERATURE: 98.7 F | BODY MASS INDEX: 32.4 KG/M2 | WEIGHT: 171.6 LBS | OXYGEN SATURATION: 97 % | SYSTOLIC BLOOD PRESSURE: 119 MMHG | DIASTOLIC BLOOD PRESSURE: 74 MMHG | RESPIRATION RATE: 18 BRPM

## 2023-07-18 DIAGNOSIS — R06.83 SNORING: ICD-10-CM

## 2023-07-18 DIAGNOSIS — G47.9 SLEEP DISTURBANCE: Primary | ICD-10-CM

## 2023-07-18 DIAGNOSIS — E66.9 CLASS 1 OBESITY WITH BODY MASS INDEX (BMI) OF 32.0 TO 32.9 IN ADULT, UNSPECIFIED OBESITY TYPE, UNSPECIFIED WHETHER SERIOUS COMORBIDITY PRESENT: ICD-10-CM

## 2023-07-18 DIAGNOSIS — Z13.9 SCREENING DUE: ICD-10-CM

## 2023-07-18 PROCEDURE — T1015 CLINIC SERVICE: HCPCS

## 2023-07-18 PROCEDURE — 99213 OFFICE O/P EST LOW 20 MIN: CPT

## 2023-08-16 ENCOUNTER — TELEPHONE (OUTPATIENT)
Dept: FAMILY MEDICINE CLINIC | Facility: CLINIC | Age: 38
End: 2023-08-16

## 2023-08-17 ENCOUNTER — TELEPHONE (OUTPATIENT)
Dept: FAMILY MEDICINE CLINIC | Facility: CLINIC | Age: 38
End: 2023-08-17

## 2023-08-17 DIAGNOSIS — Z02.1 PRE-EMPLOYMENT HEALTH SCREENING EXAMINATION: Primary | ICD-10-CM

## 2023-08-17 NOTE — TELEPHONE ENCOUNTER
Davey, my name is Dennie Raymond, birth date [birth year]. I'm just returning your call with my return call. I'm trying to make an appointment for a TB test. If you could please give me a call back [phone]. Thank you very much.

## 2024-01-14 ENCOUNTER — OFFICE VISIT (OUTPATIENT)
Dept: URGENT CARE | Facility: MEDICAL CENTER | Age: 39
End: 2024-01-14
Payer: COMMERCIAL

## 2024-01-14 VITALS
WEIGHT: 178 LBS | TEMPERATURE: 98.2 F | RESPIRATION RATE: 20 BRPM | OXYGEN SATURATION: 99 % | SYSTOLIC BLOOD PRESSURE: 126 MMHG | HEART RATE: 100 BPM | BODY MASS INDEX: 33.63 KG/M2 | DIASTOLIC BLOOD PRESSURE: 80 MMHG

## 2024-01-14 DIAGNOSIS — K04.7 DENTAL INFECTION: Primary | ICD-10-CM

## 2024-01-14 PROCEDURE — 99212 OFFICE O/P EST SF 10 MIN: CPT

## 2024-01-14 RX ORDER — CLINDAMYCIN HYDROCHLORIDE 300 MG/1
300 CAPSULE ORAL 4 TIMES DAILY
Qty: 40 CAPSULE | Refills: 0 | Status: SHIPPED | OUTPATIENT
Start: 2024-01-14 | End: 2024-01-24

## 2024-01-14 NOTE — PROGRESS NOTES
[facility] Now        NAME: Ana Luisa Bowman is a 38 y.o. female  : 1985    MRN: [id number]  DATE: 2024  TIME: 5:26 PM    Assessment and Plan   Dental infection [K04.7]  1. Dental infection  clindamycin (CLEOCIN) 300 MG capsule            Patient Instructions       Follow up with PCP in 3-5 days.  Proceed to  ER if symptoms worsen.    Chief Complaint     Chief Complaint   Patient presents with   • Dental Pain     Bottom and Upper dental pain. Started 3 days ago. Has been taking tylenol and Ibuprofen. Has left side facial and neck swelling           History of Present Illness       Patient here with dental pain. Was unable to get into her dentist in a timely manner and was referred here. Pain started about 3 days ago. Patient having upper and lower jaw pain with swelling of the left side of the face and neck. Taking tylenol and ibuprofen- last took dose of tylenol around 11am. Tylenol: 650mg, ibuprofen 400mg alternating.     She has made an appt with her dentist but was told she needed to be seen for the infection to clear before she could be seen/treated.         Review of Systems   Review of Systems   Constitutional:  Negative for chills, fatigue and fever.   HENT:  Positive for dental problem and facial swelling. Negative for congestion, ear pain, postnasal drip, rhinorrhea, sinus pressure, sinus pain, sore throat and trouble swallowing.    Respiratory:  Negative for cough and shortness of breath.    Cardiovascular:  Negative for chest pain and palpitations.   Gastrointestinal:  Negative for abdominal pain, constipation, diarrhea, nausea and vomiting.   Musculoskeletal:  Negative for arthralgias, back pain and myalgias.   Skin:  Negative for color change and rash.   All other systems reviewed and are negative.        Current Medications       Current Outpatient Medications:   •  clindamycin (CLEOCIN) 300 MG capsule, Take 1 capsule (300 mg total) by mouth 4 (four) times a day for 10  days, Disp: 40 capsule, Rfl: 0    Current Allergies     Allergies as of 2024 - Reviewed 2024   Allergen Reaction Noted   • Amoxicillin Anaphylaxis 2014   • Penicillins Anaphylaxis 2014   • Codeine Nausea Only 2016            The following portions of the patient's history were reviewed and updated as appropriate: allergies, current medications, past family history, past medical history, past social history, past surgical history and problem list.     Past Medical History:   Diagnosis Date   • Cancer (HCC)     sister with lukemia maternal grandmother passed with skin cancer    • Depression     after miscarriage   • Fetal renal anomaly    • Gestational diabetes     pregnancy with daughter   • Migraine    • Right foot strain, initial encounter 2020   • Suspected problem with fetal growth not found     last assessed 14       Past Surgical History:   Procedure Laterality Date   •  SECTION  10/10/2014    due to daughter being breech   • FOOT SURGERY Left    • [place]  DELIVERY ONLY N/A 2018    Procedure:  SECTION () REPEAT;  Surgeon: Annelise Harvey MD;  Location: [facility];  Service: Obstetrics   • TUBAL LIGATION N/A 2018    Procedure: LIGATION/COAGULATION TUBAL;  Surgeon: Annelise Harvey MD;  Location: [facility];  Service: Obstetrics       Family History   Problem Relation Age of Onset   • Heart defect Mother         cardiac disorder   • Leukemia Sister    • Cancer Family    • Diabetes Family    • Leukemia Family    • Parkinsonism Family    • No Known Problems Father    • No Known Problems Brother    • No Known Problems Daughter    • No Known Problems Sister    • No Known Problems Sister    • No Known Problems Sister    • No Known Problems Sister    • No Known Problems Sister    • No Known Problems Sister    • No Known Problems Sister    • No Known Problems Sister    • Cleft palate Brother    • No Known Problems Brother     • No Known Problems Brother    • No Known Problems Brother          Medications have been verified.        Objective   /80   Pulse 100   Temp 98.2 °F (36.8 °C)   Resp 20   Wt 80.7 kg (178 lb)   SpO2 99%   BMI 33.63 kg/m²        Physical Exam     Physical Exam  Vitals and nursing note reviewed.   Constitutional:       General: She is not in acute distress.     Appearance: Normal appearance. She is normal weight. She is not ill-appearing.   HENT:      Head: Normocephalic and atraumatic.      Right Ear: Tympanic membrane, ear canal and external ear normal.      Left Ear: Tympanic membrane, ear canal and external ear normal.      Nose: Nose normal.      Mouth/Throat:      Mouth: Mucous membranes are moist.      Dentition: Abnormal dentition. Dental tenderness, dental caries and gum lesions present.      Pharynx: Oropharynx is clear.        Comments: Blister/abscess on the buccal aspect of the top gum on either side of the frenulum.     Overall dental health poor.   Eyes:      Extraocular Movements: Extraocular movements intact.      Conjunctiva/sclera: Conjunctivae normal.      Pupils: Pupils are equal, round, and reactive to light.   Cardiovascular:      Rate and Rhythm: Normal rate and regular rhythm.      Pulses: Normal pulses.      Heart sounds: Normal heart sounds.   Pulmonary:      Effort: Pulmonary effort is normal.      Breath sounds: Normal breath sounds.   Abdominal:      General: Abdomen is flat. Bowel sounds are normal.      Palpations: Abdomen is soft.   Musculoskeletal:         General: Normal range of motion.      Cervical back: Normal range of motion and neck supple.   Skin:     General: Skin is warm.      Capillary Refill: Capillary refill takes less than 2 seconds.   Neurological:      General: No focal deficit present.      Mental Status: She is alert and oriented to person, place, and time.   Psychiatric:         Mood and Affect: Mood normal.         Behavior: Behavior normal.

## 2024-07-08 ENCOUNTER — OFFICE VISIT (OUTPATIENT)
Dept: URGENT CARE | Facility: MEDICAL CENTER | Age: 39
End: 2024-07-08
Payer: COMMERCIAL

## 2024-07-08 VITALS
OXYGEN SATURATION: 97 % | WEIGHT: 170 LBS | SYSTOLIC BLOOD PRESSURE: 98 MMHG | DIASTOLIC BLOOD PRESSURE: 68 MMHG | HEART RATE: 77 BPM | BODY MASS INDEX: 32.12 KG/M2 | RESPIRATION RATE: 20 BRPM | TEMPERATURE: 97.5 F

## 2024-07-08 DIAGNOSIS — T25.222A PARTIAL THICKNESS BURN OF LEFT FOOT, INITIAL ENCOUNTER: Primary | ICD-10-CM

## 2024-07-08 PROCEDURE — 99213 OFFICE O/P EST LOW 20 MIN: CPT

## 2024-07-08 RX ORDER — CLINDAMYCIN HYDROCHLORIDE 300 MG/1
300 CAPSULE ORAL 4 TIMES DAILY
Qty: 20 CAPSULE | Refills: 0 | Status: SHIPPED | OUTPATIENT
Start: 2024-07-08 | End: 2024-07-13

## 2024-07-08 NOTE — PROGRESS NOTES
[facility] Now        NAME: Ana Luisa Bowman is a 38 y.o. female  : 1985    MRN: [id number]  DATE: 2024  TIME: 7:22 PM    Assessment and Plan   Partial thickness burn of left foot, initial encounter [T25.222A]  1. Partial thickness burn of left foot, initial encounter  clindamycin (CLEOCIN) 300 MG capsule            Patient Instructions       Follow up with PCP in 3-5 days.  Proceed to  ER if symptoms worsen.    If tests are performed, our office will contact you with results only if changes need to made to the care plan discussed with you at the visit. You can review your full results on [facility].    Chief Complaint     Chief Complaint   Patient presents with    Burn     Burned on left foot. Cooking oil dropped on her anterior foot. Did have a blister that popped and area is red and draining. Happened about 3 days ago. Foot is swollen. Cleaned every night and applied antibiotic ointment.          History of Present Illness       38-year-old female patient presenting with a burn to the left foot.  She states 3 days ago she was deep frying chicken nuggets and when she placed them in the cooking oil, the oil overflowed and landed on her left anterior foot.  She is complaining of pain, popped blister, and foot swelling.  She has been cleaning it every night and applying antibiotic ointment.  She presents today due to increased redness and swelling and concern for infection.        Review of Systems   Review of Systems   Constitutional: Negative.    HENT: Negative.     Respiratory: Negative.     Cardiovascular: Negative.    Gastrointestinal: Negative.    Musculoskeletal:  Positive for arthralgias (left anterior foot) and joint swelling (left anterior foot).   Skin:  Positive for color change (Erythema to left anterior foot) and wound (dime sized round spot on anterior left foot with erythema surrounding central scabbed wound).         Current Medications       Current Outpatient  Medications:     clindamycin (CLEOCIN) 300 MG capsule, Take 1 capsule (300 mg total) by mouth 4 (four) times a day for 5 days, Disp: 20 capsule, Rfl: 0    Current Allergies     Allergies as of 2024 - Reviewed 2024   Allergen Reaction Noted    Amoxicillin Anaphylaxis 2014    Penicillins Anaphylaxis 2014    Codeine Nausea Only 2016            The following portions of the patient's history were reviewed and updated as appropriate: allergies, current medications, past family history, past medical history, past social history, past surgical history and problem list.     Past Medical History:   Diagnosis Date    Cancer ([facility])     sister with lukemia maternal grandmother passed with skin cancer     Depression     after miscarriage    Fetal renal anomaly     Gestational diabetes     pregnancy with daughter    Migraine     Right foot strain, initial encounter 2020    Suspected problem with fetal growth not found     last assessed 14       Past Surgical History:   Procedure Laterality Date     SECTION  10/10/2014    due to daughter being breech    FOOT SURGERY Left     [place]  DELIVERY ONLY N/A 2018    Procedure:  SECTION () REPEAT;  Surgeon: Annelise Harvey MD;  Location: [facility];  Service: Obstetrics    TUBAL LIGATION N/A 2018    Procedure: LIGATION/COAGULATION TUBAL;  Surgeon: Annelise Harvey MD;  Location: [facility];  Service: Obstetrics       Family History   Problem Relation Age of Onset    Heart defect Mother         cardiac disorder    Leukemia Sister     Cancer Family     Diabetes Family     Leukemia Family     Parkinsonism Family     No Known Problems Father     No Known Problems Brother     No Known Problems Daughter     No Known Problems Sister     No Known Problems Sister     No Known Problems Sister     No Known Problems Sister     No Known Problems Sister     No Known Problems Sister     No Known Problems  Sister     No Known Problems Sister     Cleft palate Brother     No Known Problems Brother     No Known Problems Brother     No Known Problems Brother          Medications have been verified.        Objective   BP 98/68   Pulse 77   Temp 97.5 °F (36.4 °C)   Resp 20   Wt 77.1 kg (170 lb)   SpO2 97%   BMI 32.12 kg/m²        Physical Exam     Physical Exam  Vitals and nursing note reviewed.   Constitutional:       Appearance: Normal appearance.   HENT:      Head: Normocephalic and atraumatic.      Right Ear: External ear normal.      Left Ear: External ear normal.   Eyes:      Extraocular Movements: Extraocular movements intact.      Pupils: Pupils are equal, round, and reactive to light.      Comments: Eyeglasses for vision correction   Cardiovascular:      Rate and Rhythm: Normal rate and regular rhythm.      Pulses: Normal pulses.      Heart sounds: Normal heart sounds.   Pulmonary:      Effort: Pulmonary effort is normal.      Breath sounds: Normal breath sounds.   Musculoskeletal:         General: Normal range of motion.      Cervical back: Normal range of motion and neck supple.        Feet:    Skin:     General: Skin is warm and dry.      Capillary Refill: Capillary refill takes less than 2 seconds.      Findings: Wound present.   Neurological:      Mental Status: She is alert.

## 2024-07-08 NOTE — PATIENT INSTRUCTIONS
Keep the wound clean and dry-wound care as you were performing prior to visit today is sufficient.     Monitor for signs of worsening infection such as increased redness, swelling or discharge. If this occurs you will need a recheck of the wound.     Take the antibiotics as prescribed for the duration of time prescribed.  Do not stop even if the wound looks better

## 2024-08-14 ENCOUNTER — OFFICE VISIT (OUTPATIENT)
Dept: URGENT CARE | Facility: MEDICAL CENTER | Age: 39
End: 2024-08-14
Payer: COMMERCIAL

## 2024-08-14 VITALS — OXYGEN SATURATION: 99 % | TEMPERATURE: 98.3 F | RESPIRATION RATE: 18 BRPM | HEART RATE: 94 BPM

## 2024-08-14 DIAGNOSIS — K04.7 DENTAL INFECTION: Primary | ICD-10-CM

## 2024-08-14 PROCEDURE — 99213 OFFICE O/P EST LOW 20 MIN: CPT

## 2024-08-14 RX ORDER — CLINDAMYCIN HCL 300 MG
300 CAPSULE ORAL 4 TIMES DAILY
Qty: 28 CAPSULE | Refills: 0 | Status: SHIPPED | OUTPATIENT
Start: 2024-08-14 | End: 2024-08-21

## 2024-08-14 NOTE — PROGRESS NOTES
[facility] Now        NAME: Ana Luisa Bowman is a 39 y.o. female  : 1985    MRN: [id number]  DATE: 2024  TIME: 5:53 PM    Assessment and Plan   Dental infection [K04.7]  1. Dental infection  clindamycin (CLEOCIN) 300 MG capsule            Patient Instructions       Follow up with PCP in 3-5 days.  Proceed to  ER if symptoms worsen.    If tests are performed, our office will contact you with results only if changes need to made to the care plan discussed with you at the visit. [facility]    Chief Complaint     Chief Complaint   Patient presents with   • Dental Pain         History of Present Illness       Patient having dental pain left lower jaw. Onset was about 2 days ago. She has had fevers, TMAX was 101.8. At home she has been taking tylenol- last dose was 3pm today. Has an appt in 4mth with her dentist - [facility] [facility]. Last similar issue was 6mth ago was placed on clindamycin.         Review of Systems   Review of Systems   Constitutional:  Positive for appetite change and fever. Negative for chills and fatigue.   HENT:  Positive for dental problem and facial swelling.    Neurological:  Negative for dizziness and light-headedness.         Current Medications       Current Outpatient Medications:   •  clindamycin (CLEOCIN) 300 MG capsule, Take 1 capsule (300 mg total) by mouth 4 (four) times a day for 7 days, Disp: 28 capsule, Rfl: 0    Current Allergies     Allergies as of 2024 - Reviewed 2024   Allergen Reaction Noted   • Amoxicillin Anaphylaxis 2014   • Penicillins Anaphylaxis 2014   • Codeine Nausea Only 2016            The following portions of the patient's history were reviewed and updated as appropriate: allergies, current medications, past family history, past medical history, past social history, past surgical history and problem list.     Past Medical History:   Diagnosis  Date   • Cancer (HCC)     sister with lukemia maternal grandmother passed with skin cancer    • Depression     after miscarriage   • Fetal renal anomaly    • Gestational diabetes     pregnancy with daughter   • Migraine    • Right foot strain, initial encounter 2020   • Suspected problem with fetal growth not found     last assessed 14       Past Surgical History:   Procedure Laterality Date   •  SECTION  10/10/2014    due to daughter being breech   • FOOT SURGERY Left    • [place]  DELIVERY ONLY N/A 2018    Procedure:  SECTION () REPEAT;  Surgeon: Annelise Harvey MD;  Location: [facility];  Service: Obstetrics   • TUBAL LIGATION N/A 2018    Procedure: LIGATION/COAGULATION TUBAL;  Surgeon: Annelise Harvey MD;  Location: [facility];  Service: Obstetrics       Family History   Problem Relation Age of Onset   • Heart defect Mother         cardiac disorder   • Leukemia Sister    • Cancer Family    • Diabetes Family    • Leukemia Family    • Parkinsonism Family    • No Known Problems Father    • No Known Problems Brother    • No Known Problems Daughter    • No Known Problems Sister    • No Known Problems Sister    • No Known Problems Sister    • No Known Problems Sister    • No Known Problems Sister    • No Known Problems Sister    • No Known Problems Sister    • No Known Problems Sister    • Cleft palate Brother    • No Known Problems Brother    • No Known Problems Brother    • No Known Problems Brother          Medications have been verified.        Objective   Pulse 94   Temp 98.3 °F (36.8 °C)   Resp 18   SpO2 99%        Physical Exam     Physical Exam  Vitals and nursing note reviewed.   Constitutional:       General: She is awake.      Appearance: Normal appearance. She is well-developed.   HENT:      Head: Normocephalic and atraumatic.      Jaw: Tenderness and swelling present.        Mouth/Throat:      Lips: Pink.      Mouth: Mucous membranes  are moist.      Dentition: Abnormal dentition. Dental tenderness, gingival swelling and dental caries present.     Cardiovascular:      Rate and Rhythm: Normal rate and regular rhythm.      Pulses: Normal pulses.      Heart sounds: Normal heart sounds.   Pulmonary:      Effort: Pulmonary effort is normal.      Breath sounds: Normal breath sounds.   Skin:     General: Skin is warm and dry.      Capillary Refill: Capillary refill takes less than 2 seconds.      Findings: No rash.   Neurological:      General: No focal deficit present.      Mental Status: She is alert. Mental status is at baseline.   Psychiatric:         Mood and Affect: Mood normal.         Behavior: Behavior is cooperative.

## 2024-08-14 NOTE — PATIENT INSTRUCTIONS
You may take over the counter Tylenol (Acetaminophen) and/or Motrin (Ibuprofen) as needed, as directed on packaging.   Be sure to get plenty of rest, and drinking fluids to remain hydrated.     Please follow up with your primary provider in the next several days. Should you have any worsening of symptoms, or lack of improvement please be re-evaluated. If needed for significant concerns, consider 911 or ER evaluation.     [facility]  Doctor: Grey Walters DMD  Location: [address] [place] [address]. [address]  Phone number: [phone]

## 2024-12-03 ENCOUNTER — OFFICE VISIT (OUTPATIENT)
Dept: FAMILY MEDICINE CLINIC | Facility: CLINIC | Age: 39
End: 2024-12-03
Payer: COMMERCIAL

## 2024-12-03 VITALS
HEIGHT: 61 IN | TEMPERATURE: 98.4 F | BODY MASS INDEX: 33.83 KG/M2 | SYSTOLIC BLOOD PRESSURE: 106 MMHG | DIASTOLIC BLOOD PRESSURE: 82 MMHG | WEIGHT: 179.2 LBS | OXYGEN SATURATION: 99 % | RESPIRATION RATE: 18 BRPM | HEART RATE: 102 BPM

## 2024-12-03 DIAGNOSIS — Z13.220 ENCOUNTER FOR LIPID SCREENING FOR CARDIOVASCULAR DISEASE: ICD-10-CM

## 2024-12-03 DIAGNOSIS — K21.9 GASTROESOPHAGEAL REFLUX DISEASE, UNSPECIFIED WHETHER ESOPHAGITIS PRESENT: Primary | ICD-10-CM

## 2024-12-03 DIAGNOSIS — Z13.6 ENCOUNTER FOR LIPID SCREENING FOR CARDIOVASCULAR DISEASE: ICD-10-CM

## 2024-12-03 DIAGNOSIS — F33.1 MODERATE EPISODE OF RECURRENT MAJOR DEPRESSIVE DISORDER (HCC): ICD-10-CM

## 2024-12-03 LAB
ALBUMIN SERPL BCG-MCNC: 5 G/DL (ref 3.5–5)
ALP SERPL-CCNC: 69 U/L (ref 34–104)
ALT SERPL W P-5'-P-CCNC: 26 U/L (ref 7–52)
ANION GAP SERPL CALCULATED.3IONS-SCNC: 10 MMOL/L (ref 4–13)
AST SERPL W P-5'-P-CCNC: 14 U/L (ref 13–39)
BASOPHILS # BLD AUTO: 0.07 THOUSANDS/ΜL (ref 0–0.1)
BASOPHILS NFR BLD AUTO: 1 % (ref 0–1)
BILIRUB SERPL-MCNC: 0.41 MG/DL (ref 0.2–1)
BUN SERPL-MCNC: 11 MG/DL (ref 5–25)
CALCIUM SERPL-MCNC: 9.8 MG/DL (ref 8.4–10.2)
CHLORIDE SERPL-SCNC: 103 MMOL/L (ref 96–108)
CHOLEST SERPL-MCNC: 224 MG/DL (ref ?–200)
CO2 SERPL-SCNC: 24 MMOL/L (ref 21–32)
CREAT SERPL-MCNC: 0.78 MG/DL (ref 0.6–1.3)
EOSINOPHIL # BLD AUTO: 0.13 THOUSAND/ΜL (ref 0–0.61)
EOSINOPHIL NFR BLD AUTO: 1 % (ref 0–6)
ERYTHROCYTE [DISTWIDTH] IN BLOOD BY AUTOMATED COUNT: 13.2 % (ref 11.6–15.1)
GFR SERPL CREATININE-BSD FRML MDRD: 97 ML/MIN/1.73SQ M
GLUCOSE P FAST SERPL-MCNC: 80 MG/DL (ref 65–99)
HCT VFR BLD AUTO: 45.2 % (ref 34.8–46.1)
HDLC SERPL-MCNC: 41 MG/DL
HGB BLD-MCNC: 15.1 G/DL (ref 11.5–15.4)
IMM GRANULOCYTES # BLD AUTO: 0.05 THOUSAND/UL (ref 0–0.2)
IMM GRANULOCYTES NFR BLD AUTO: 0 % (ref 0–2)
LDLC SERPL CALC-MCNC: 132 MG/DL (ref 0–100)
LYMPHOCYTES # BLD AUTO: 3.33 THOUSANDS/ΜL (ref 0.6–4.47)
LYMPHOCYTES NFR BLD AUTO: 26 % (ref 14–44)
MCH RBC QN AUTO: 29.5 PG (ref 26.8–34.3)
MCHC RBC AUTO-ENTMCNC: 33.4 G/DL (ref 31.4–37.4)
MCV RBC AUTO: 89 FL (ref 82–98)
MONOCYTES # BLD AUTO: 0.7 THOUSAND/ΜL (ref 0.17–1.22)
MONOCYTES NFR BLD AUTO: 6 % (ref 4–12)
NEUTROPHILS # BLD AUTO: 8.38 THOUSANDS/ΜL (ref 1.85–7.62)
NEUTS SEG NFR BLD AUTO: 66 % (ref 43–75)
NONHDLC SERPL-MCNC: 183 MG/DL
NRBC BLD AUTO-RTO: 0 /100 WBCS
PLATELET # BLD AUTO: 355 THOUSANDS/UL (ref 149–390)
PMV BLD AUTO: 11 FL (ref 8.9–12.7)
POTASSIUM SERPL-SCNC: 4.3 MMOL/L (ref 3.5–5.3)
PROT SERPL-MCNC: 7.7 G/DL (ref 6.4–8.4)
RBC # BLD AUTO: 5.11 MILLION/UL (ref 3.81–5.12)
SL AMB POCT HEMOGLOBIN AIC: 5.3 (ref ?–6.5)
SODIUM SERPL-SCNC: 137 MMOL/L (ref 135–147)
TRIGL SERPL-MCNC: 255 MG/DL (ref ?–150)
WBC # BLD AUTO: 12.66 THOUSAND/UL (ref 4.31–10.16)

## 2024-12-03 PROCEDURE — 99213 OFFICE O/P EST LOW 20 MIN: CPT | Performed by: STUDENT IN AN ORGANIZED HEALTH CARE EDUCATION/TRAINING PROGRAM

## 2024-12-03 PROCEDURE — 80053 COMPREHEN METABOLIC PANEL: CPT | Performed by: STUDENT IN AN ORGANIZED HEALTH CARE EDUCATION/TRAINING PROGRAM

## 2024-12-03 PROCEDURE — 80061 LIPID PANEL: CPT | Performed by: STUDENT IN AN ORGANIZED HEALTH CARE EDUCATION/TRAINING PROGRAM

## 2024-12-03 PROCEDURE — 86803 HEPATITIS C AB TEST: CPT | Performed by: STUDENT IN AN ORGANIZED HEALTH CARE EDUCATION/TRAINING PROGRAM

## 2024-12-03 PROCEDURE — 99395 PREV VISIT EST AGE 18-39: CPT | Performed by: STUDENT IN AN ORGANIZED HEALTH CARE EDUCATION/TRAINING PROGRAM

## 2024-12-03 PROCEDURE — 83036 HEMOGLOBIN GLYCOSYLATED A1C: CPT | Performed by: FAMILY MEDICINE

## 2024-12-03 PROCEDURE — T1015 CLINIC SERVICE: HCPCS | Performed by: FAMILY MEDICINE

## 2024-12-03 PROCEDURE — 85025 COMPLETE CBC W/AUTO DIFF WBC: CPT | Performed by: STUDENT IN AN ORGANIZED HEALTH CARE EDUCATION/TRAINING PROGRAM

## 2024-12-03 NOTE — PROGRESS NOTES
Adult Annual Physical  Name: Ana Luisa Bowman      : 1985      MRN: [id number]  Encounter Provider: Alie Gray DO  Encounter Date: 12/3/2024   Encounter department: [facility]    Assessment & Plan  Gastroesophageal reflux disease, unspecified whether esophagitis present  -Consult to GI placed given extensive history of GERD and non-responsive to medication per pt history    Orders:    Ambulatory Referral to Gastroenterology; Future    Encounter for lipid screening for cardiovascular disease  -Labs ordered as below   -Will treat based on results     Orders:    CBC and differential; Future    Comprehensive metabolic panel; Future    Lipid panel; Future    POCT hemoglobin A1c    Hepatitis C antibody; Future    Moderate episode of recurrent major depressive disorder (HCC)  -PHQ9 of 4   -Continue therapy as per therapy provider   -Will continue to  about potential therapy options         Immunizations and preventive care screenings were discussed with patient today. Appropriate education was printed on patient's after visit summary.    Counseling:  Alcohol/drug use: discussed moderation in alcohol intake, the recommendations for healthy alcohol use, and avoidance of illicit drug use.  Dental Health: discussed importance of regular tooth brushing, flossing, and dental visits.  Injury prevention: discussed safety/seat belts, safety helmets, smoke detectors, carbon monoxide detectors, and smoking near bedding or upholstery.  Sexual health: discussed sexually transmitted diseases, partner selection, use of condoms, avoidance of unintended pregnancy, and contraceptive alternatives.  Exercise: the importance of regular exercise/physical activity was discussed. Recommend exercise 3-5 times per week for at least 30 minutes.   BMI Counseling: Body mass index is 33.86 kg/m². The BMI is above normal. Nutrition recommendations include reducing portion sizes, 3-5 servings of  "fruits/vegetables daily, consuming healthier snacks, decreasing soda and/or juice intake, moderation in carbohydrate intake, and increasing intake of lean protein. Exercise recommendations include exercising 3-5 times per week, joining a gym, and strength training exercises.     BMI Counseling: Body mass index is 33.86 kg/m². The BMI is above normal. Exercise recommendations include exercising 3-5 times per week and strength training exercises.     Depression Screening and Follow-up Plan: Patient's depression screening was positive with a PHQ-2 score of 4. Their PHQ-9 score was 4. Continue regular follow-up with their mental health provider who is managing their mental health condition(s).     Tobacco Cessation Counseling: Tobacco cessation counseling was not provided. The patient is sincerely urged to quit consumption of tobacco. She is ready to quit tobacco. Medication options discussed. Patient refused medication.         History of Present Illness     Adult Annual Physical:  Patient presents for annual physical. Ana Luisa Bowman is a 40 y/o F with PMHx of obesity who presents to me for first time for annual exam. Patient offers no complaints today. Declines flu shot because she is scared of needles.     Positive depression screen of PHQ-9 score of 4. Patient has history of depression in the past. Has had several attempts at medication. Seeing a therapist currently. Not on any medication for depression. Self medicating with marijuana. Does not desire medication at this time and wishes to continue with therapy. Counseled on the potential side effects on marijuana.     Patient has history of GERD \"since she was 14\". She is not currently on medications. Agreeable to GI consult.             .     Diet and Physical Activity:  - Diet/Nutrition: portion control and heart healthy (low sodium) diet. eats once a day. Drinks mountain dew mostly  - Exercise: no formal exercise.    Depression Screening:  - PHQ-2 Score: 4  - " "PHQ-9 Score: 4    General Health:  - Sleep: 7-8 hours of sleep on average.  - Hearing: normal hearing bilateral ears.  - Vision: no vision problems, wears glasses and most recent eye exam > 1 year ago. eye appointment in March  - Dental: no dental visits for > 1 year and brushes teeth twice daily.    /GYN Health:  - Follows with GYN: yes.   - Last menstrual cycle: 11/5/2024.   - History of STDs: no  - Contraception:. tubal      Advanced Care Planning:  - Has an advanced directive?: no    - Has a durable medical POA?: no    - ACP document given to patient?: no      Preventive Screenings  Colorectal Cancer Screening: N/A, no family history of colon cancer   Breast Cancer Screening: strong family history of breast cancer   Two or more family members with breast and/or ovarian cancer grandmother and paternal aunt)  Cervical Cancer Screening: pt will schedule with OBGYN  Lung Cancer Screening: Will be indicated at 51 y/o given active smoking history   STD Screening: Declined   Preconception Counseling: Tubal ligation   Cardiovascular Screening: Labs ordered   Kidney health: Labs ordered   Advance Care Planning: Discussed   History of Bariatric surgery: No   Polypharmacy? No   Vaccination indicated: flu declined     Review of Systems   Constitutional:  Negative for fever.   HENT:  Negative for congestion.    Eyes:  Negative for visual disturbance.   Respiratory:  Negative for cough and shortness of breath.    Gastrointestinal:  Negative for nausea and vomiting.   Genitourinary:  Negative for menstrual problem.   Musculoskeletal:  Negative for gait problem.   Skin:  Negative for rash.   Neurological:  Negative for syncope.   Psychiatric/Behavioral:  Negative for confusion and sleep disturbance.      Objective   /82 (BP Location: Left arm, Patient Position: Sitting, Cuff Size: Large)   Pulse 102   Temp 98.4 °F (36.9 °C) (Tympanic)   Resp 18   Ht 5' 1\" (1.549 m)   Wt 81.3 kg (179 lb 3.2 oz)   SpO2 99%   BMI " 33.86 kg/m²     Physical Exam  Vitals reviewed.   Constitutional:       General: She is not in acute distress.     Appearance: Normal appearance. She is obese. She is not ill-appearing or toxic-appearing.   HENT:      Head: Normocephalic and atraumatic.      Nose: Nose normal.   Eyes:      Conjunctiva/sclera: Conjunctivae normal.   Cardiovascular:      Rate and Rhythm: Normal rate and regular rhythm.      Heart sounds: Normal heart sounds. No murmur heard.     No friction rub. No gallop.   Pulmonary:      Effort: Pulmonary effort is normal. No respiratory distress.      Breath sounds: Normal breath sounds. No wheezing, rhonchi or rales.   Skin:     General: Skin is warm and dry.      Findings: No rash.   Neurological:      General: No focal deficit present.      Mental Status: She is alert and oriented to person, place, and time.      Gait: Gait normal.   Psychiatric:         Mood and Affect: Mood normal.         Behavior: Behavior normal.         Thought Content: Thought content normal.       Alie Gray DO   PGY-2 [facility]   [facility]

## 2024-12-04 LAB — HCV AB SER QL: NORMAL

## 2024-12-06 ENCOUNTER — TELEPHONE (OUTPATIENT)
Dept: FAMILY MEDICINE CLINIC | Facility: CLINIC | Age: 39
End: 2024-12-06

## 2024-12-06 DIAGNOSIS — D72.829 LEUKOCYTOSIS, UNSPECIFIED TYPE: Primary | ICD-10-CM

## 2024-12-06 DIAGNOSIS — E78.5 DYSLIPIDEMIA: ICD-10-CM

## 2024-12-06 NOTE — TELEPHONE ENCOUNTER
----- Message from Alie Gray DO sent at 12/6/2024 11:14 AM EST -----  Regarding: Labs  Please call patient and let her know she had elevated white blood cells on labs. This could be a sign of infection or inflammation. At this point, it does not look severe or dangerous. I put in three blood tests to get done in two weeks.     Cholesterol was also high. Recommend improving diet with more fruit/veggies. Less sugar and carbs like pasta and bread. We will recheck these levels in 6 months and if patient wants to make an appointment for weight management, we can do that.

## 2025-04-08 ENCOUNTER — OFFICE VISIT (OUTPATIENT)
Dept: URGENT CARE | Facility: MEDICAL CENTER | Age: 40
End: 2025-04-08
Payer: COMMERCIAL

## 2025-04-08 VITALS
OXYGEN SATURATION: 98 % | DIASTOLIC BLOOD PRESSURE: 68 MMHG | SYSTOLIC BLOOD PRESSURE: 118 MMHG | HEART RATE: 86 BPM | RESPIRATION RATE: 18 BRPM | TEMPERATURE: 98.3 F | WEIGHT: 176 LBS | BODY MASS INDEX: 33.25 KG/M2

## 2025-04-08 DIAGNOSIS — R21 RASH: Primary | ICD-10-CM

## 2025-04-08 DIAGNOSIS — Z20.818 EXPOSURE TO STREP THROAT: ICD-10-CM

## 2025-04-08 DIAGNOSIS — G47.9 SLEEP DISTURBANCE: ICD-10-CM

## 2025-04-08 DIAGNOSIS — R06.83 SNORING: ICD-10-CM

## 2025-04-08 LAB — S PYO AG THROAT QL: NEGATIVE

## 2025-04-08 PROCEDURE — 87880 STREP A ASSAY W/OPTIC: CPT

## 2025-04-08 PROCEDURE — 87070 CULTURE OTHR SPECIMN AEROBIC: CPT

## 2025-04-08 PROCEDURE — 99214 OFFICE O/P EST MOD 30 MIN: CPT

## 2025-04-08 NOTE — PATIENT INSTRUCTIONS
Please follow up with your primary provider in the next several days. Should you have any worsening of symptoms, or lack of improvement please be re-evaluated. If needed for significant concerns, consider 911 or ER evaluation.     The unnecessary use of antibiotics can have harmful affect, unwanted side-effects and can lead to antibiotic resistant bacteria in the future. You are being treated today for a viral illness. Viral illnesses do not require antibiotics, and are treated symptomatically.   According to the Centers for Disease Control and Prevention, about one-third of antibiotic use in the outpatient setting, is not needed nor appropriate. Antibiotics treat infections caused by bacteria. But they don't treat infections caused by viruses (viral infections). For example, an antibiotic is the correct treatment for strep throat, which is caused by bacteria. But it's not the right treatment for most sore throats, which are caused by viruses.By being proactive and treating your individual symptoms, this may help you feel better.     You may have had testing done today which when completed and resulted may change the course of your treatment. It is at that time that if a change in your treatment is necessary that you will hear from our office. I would also recommend you follow up with your primary care provider in the next few days.

## 2025-04-08 NOTE — PROGRESS NOTES
[facility]        NAME: Ana Luisa Bowman is a 39 y.o. female  : 1985    MRN: [id number]  DATE: 2025  TIME: 4:50 PM    Assessment and Plan   Rash [R21]  1. Rash  POCT rapid ANTIGEN strepA    Throat culture      2. Exposure to strep throat  POCT rapid ANTIGEN strepA    Throat culture            Patient Instructions       Follow up with PCP in 3-5 days.  Proceed to  ER if symptoms worsen.    If tests are performed, our office will contact you with results only if changes need to made to the care plan discussed with you at the visit. You can review your full results on [facility].    Chief Complaint     Chief Complaint   Patient presents with   • Rash     Pt began with rash all over  today. Not itchy or painful.         History of Present Illness       Patient started with full body rash. She denies any new foods/meds/soaps/detergents etc. Denies any itching. Her daughter recently did have strep. Denies any sore throat, fever or chills. Has some body aches but otherwise denies any symptoms. Has not taken anything for her rash.         Review of Systems   Review of Systems   Constitutional:  Negative for chills, fatigue and fever.   HENT:  Negative for congestion, postnasal drip, rhinorrhea, sore throat and trouble swallowing.    Respiratory:  Negative for cough and shortness of breath.    Musculoskeletal:  Positive for myalgias.   Skin:  Positive for rash.         Current Medications     No current outpatient medications on file.    Current Allergies     Allergies as of 2025 - Reviewed 2025   Allergen Reaction Noted   • Amoxicillin Anaphylaxis 2014   • Penicillins Anaphylaxis 2014   • Codeine Nausea Only 2016            The following portions of the patient's history were reviewed and updated as appropriate: allergies, current medications, past family history, past medical history, past social history, past surgical history and problem list.     Past  Medical History:   Diagnosis Date   • Cancer (HCC)     sister with lukenicole maternal grandmother passed with skin cancer    • Depression     after miscarriage   • Fetal renal anomaly    • Gestational diabetes     pregnancy with daughter   • Migraine    • Right foot strain, initial encounter 2020   • Suspected problem with fetal growth not found     last assessed 14       Past Surgical History:   Procedure Laterality Date   •  SECTION  10/10/2014    due to daughter being breech   • FOOT SURGERY Left    • NE  DELIVERY ONLY N/A 2018    Procedure:  SECTION () REPEAT;  Surgeon: Annelise Harvey MD;  Location: [facility];  Service: Obstetrics   • TUBAL LIGATION N/A 2018    Procedure: LIGATION/COAGULATION TUBAL;  Surgeon: Annelise Harvey MD;  Location: [facility];  Service: Obstetrics       Family History   Problem Relation Age of Onset   • Heart defect Mother         cardiac disorder   • Leukemia Sister    • Cancer Family    • Diabetes Family    • Leukemia Family    • Parkinsonism Family    • No Known Problems Father    • No Known Problems Brother    • No Known Problems Daughter    • No Known Problems Sister    • No Known Problems Sister    • No Known Problems Sister    • No Known Problems Sister    • No Known Problems Sister    • No Known Problems Sister    • No Known Problems Sister    • No Known Problems Sister    • Cleft palate Brother    • No Known Problems Brother    • No Known Problems Brother    • No Known Problems Brother          Medications have been verified.        Objective   /68 (BP Location: Right arm, Patient Position: Sitting, Cuff Size: Standard)   Pulse 86   Temp 98.3 °F (36.8 °C) (Temporal)   Resp 18   Wt 79.8 kg (176 lb)   SpO2 98%   BMI 33.25 kg/m²        Physical Exam     Physical Exam  Vitals and nursing note reviewed.   Constitutional:       General: She is awake. She is not in acute distress.     Appearance:  Normal appearance. She is well-developed. She is not ill-appearing.   HENT:      Head: Normocephalic and atraumatic.      Mouth/Throat:      Lips: Pink.      Mouth: Mucous membranes are moist.      Pharynx: Oropharynx is clear. Uvula midline. No pharyngeal swelling, oropharyngeal exudate, posterior oropharyngeal erythema, uvula swelling or postnasal drip.   Cardiovascular:      Rate and Rhythm: Normal rate and regular rhythm.      Pulses: Normal pulses.      Heart sounds: Normal heart sounds.   Pulmonary:      Effort: Pulmonary effort is normal.      Breath sounds: Normal breath sounds.   Skin:     General: Skin is warm and dry.      Capillary Refill: Capillary refill takes less than 2 seconds.      Findings: Rash present.          Neurological:      General: No focal deficit present.      Mental Status: She is alert. Mental status is at baseline.   Psychiatric:         Mood and Affect: Mood normal.         Behavior: Behavior is cooperative.

## 2025-04-10 LAB — BACTERIA THROAT CULT: NORMAL

## (undated) DEVICE — SUT VICRYL 2-0 CT-1 36 IN J945H

## (undated) DEVICE — ADHESIVE SKN CLSR HISTOACRYL FLEX 0.5ML LF

## (undated) DEVICE — ABG MICROSTICKS SAFETY

## (undated) DEVICE — GLOVE INDICATOR UNDERGLOVE SZ 6 BLUE

## (undated) DEVICE — PACK C-SECTION PBDS

## (undated) DEVICE — SUT MONOCRYL 4-0 PS-2 27 IN Y426H

## (undated) DEVICE — GLOVE SRG BIOGEL ECLIPSE 6

## (undated) DEVICE — CHLORAPREP HI-LITE 26ML ORANGE

## (undated) DEVICE — SUT PLAIN 3-0 CT-1 27 IN 842H

## (undated) DEVICE — SUT VICRYL 0 CT 36 IN J958H